# Patient Record
Sex: FEMALE | Race: WHITE | NOT HISPANIC OR LATINO | Employment: OTHER | URBAN - METROPOLITAN AREA
[De-identification: names, ages, dates, MRNs, and addresses within clinical notes are randomized per-mention and may not be internally consistent; named-entity substitution may affect disease eponyms.]

---

## 2017-06-28 ENCOUNTER — APPOINTMENT (OUTPATIENT)
Dept: WOMENS IMAGING | Facility: HOSPITAL | Age: 57
End: 2017-06-28

## 2017-06-28 PROCEDURE — 77067 SCR MAMMO BI INCL CAD: CPT | Performed by: RADIOLOGY

## 2018-09-27 ENCOUNTER — APPOINTMENT (OUTPATIENT)
Dept: WOMENS IMAGING | Facility: HOSPITAL | Age: 58
End: 2018-09-27

## 2018-09-27 PROCEDURE — 77067 SCR MAMMO BI INCL CAD: CPT | Performed by: RADIOLOGY

## 2021-06-11 ENCOUNTER — APPOINTMENT (OUTPATIENT)
Dept: WOMENS IMAGING | Facility: HOSPITAL | Age: 61
End: 2021-06-11

## 2021-06-11 PROCEDURE — 77067 SCR MAMMO BI INCL CAD: CPT | Performed by: RADIOLOGY

## 2022-03-29 ENCOUNTER — OFFICE VISIT (OUTPATIENT)
Dept: FAMILY MEDICINE CLINIC | Facility: CLINIC | Age: 62
End: 2022-03-29

## 2022-03-29 VITALS
OXYGEN SATURATION: 99 % | DIASTOLIC BLOOD PRESSURE: 74 MMHG | WEIGHT: 183 LBS | HEART RATE: 69 BPM | SYSTOLIC BLOOD PRESSURE: 122 MMHG | BODY MASS INDEX: 32.43 KG/M2 | HEIGHT: 63 IN

## 2022-03-29 DIAGNOSIS — E03.9 ACQUIRED HYPOTHYROIDISM: Primary | ICD-10-CM

## 2022-03-29 PROCEDURE — 99213 OFFICE O/P EST LOW 20 MIN: CPT | Performed by: STUDENT IN AN ORGANIZED HEALTH CARE EDUCATION/TRAINING PROGRAM

## 2022-03-29 RX ORDER — OMEPRAZOLE 40 MG/1
CAPSULE, DELAYED RELEASE ORAL
COMMUNITY
Start: 2021-11-30

## 2022-03-29 RX ORDER — TRIAMTERENE AND HYDROCHLOROTHIAZIDE 37.5; 25 MG/1; MG/1
TABLET ORAL
COMMUNITY
Start: 2022-03-01 | End: 2022-04-03

## 2022-03-29 RX ORDER — AMLODIPINE BESYLATE 10 MG/1
TABLET ORAL
COMMUNITY
Start: 2022-03-01 | End: 2022-05-06

## 2022-03-29 RX ORDER — TRAZODONE HYDROCHLORIDE 50 MG/1
TABLET ORAL
COMMUNITY
Start: 2022-03-16 | End: 2022-06-27

## 2022-03-29 RX ORDER — LEVOTHYROXINE SODIUM 0.12 MG/1
TABLET ORAL
COMMUNITY
Start: 2022-03-01 | End: 2022-04-03

## 2022-03-29 NOTE — ASSESSMENT & PLAN NOTE
Patient with symptoms that could be consistent with acquired hypothyroidism she is uncontrolled at this time.  Advised we do blood work today, and if abnormal will repeat in 2 to 3 weeks.  If thyroid is in normal levels will plan on increasing sertraline as she is having significant stress at home, and this could be depression related.  She is agreeable with this plan, and we will discuss further when blood work is available

## 2022-03-29 NOTE — PROGRESS NOTES
"Chief Complaint  Thyroid Problem    Subjective          Nat Candelaria presents to [facility]  She states that she has been having a lot of stress at home with her  recently being disagnosed with Cancer of the esophagous, and he is starting Chemo and radiation in the next week or so. She tstates that she wants to make sure that she has everything good before this.     Thyroid Problem  Presents for follow-up visit. Symptoms include cold intolerance, depressed mood, dry skin, fatigue, hair loss and weight gain. Patient reports no palpitations or visual change. The symptoms have been worsening.       Objective   Vital Signs:   /74 (BP Location: Left arm, Patient Position: Sitting, Cuff Size: Large Adult)   Pulse 69   Ht 160 cm (63\")   Wt 83 kg (183 lb)   SpO2 99%   BMI 32.42 kg/m²     Body mass index is 32.42 kg/m².    Review of Systems   Constitutional: Positive for fatigue and unexpected weight gain.   Cardiovascular: Negative for palpitations.   Endocrine: Positive for cold intolerance.   Skin: Positive for dry skin.   Psychiatric/Behavioral: Positive for depressed mood.       Past History:  Medical History: has a past medical history of Acquired hypothyroidism, Benign hypertension, Degenerative joint disease involving multiple joints, Diverticulosis, Mixed hyperlipidemia, Obesity, Primary insomnia, Primary osteoarthritis involving multiple joints, and Recurrent major depressive episodes, mild (HCC).   Surgical History: has a past surgical history that includes Hysterectomy and Spine surgery.   Family History: family history is not on file.   Social History: reports that she has never smoked. She has never used smokeless tobacco. Alcohol use questions deferred to the physician. Drug use questions deferred to the physician.      Current Outpatient Medications:   •  amLODIPine (NORVASC) 10 MG tablet, , Disp: , Rfl:   •  levothyroxine (SYNTHROID, LEVOTHROID) 125 MCG " tablet, , Disp: , Rfl:   •  omeprazole (priLOSEC) 40 MG capsule, , Disp: , Rfl:   •  sertraline (ZOLOFT) 50 MG tablet, , Disp: , Rfl:   •  traZODone (DESYREL) 50 MG tablet, , Disp: , Rfl:   •  triamterene-hydrochlorothiazide (MAXZIDE-25) 37.5-25 MG per tablet, , Disp: , Rfl:     Allergies: Patient has no known allergies.    Physical Exam  Constitutional:       General: She is not in acute distress.     Appearance: Normal appearance. She is not ill-appearing or toxic-appearing.   HENT:      Head: Normocephalic and atraumatic.      Right Ear: Tympanic membrane and ear canal normal.      Left Ear: Tympanic membrane and ear canal normal.   Cardiovascular:      Rate and Rhythm: Normal rate and regular rhythm.      Heart sounds: No murmur heard.    No gallop.   Pulmonary:      Effort: Pulmonary effort is normal.      Breath sounds: Normal breath sounds.   Abdominal:      General: Abdomen is flat.      Palpations: Abdomen is soft.      Tenderness: There is no abdominal tenderness. There is no guarding.   Musculoskeletal:      Right lower leg: No edema.      Left lower leg: No edema.   Lymphadenopathy:      Cervical: No cervical adenopathy.   Neurological:      General: No focal deficit present.      Mental Status: She is alert and oriented to person, place, and time.   Psychiatric:         Mood and Affect: Mood normal.         Thought Content: Thought content normal.          Result Review :                   Assessment and Plan    Diagnoses and all orders for this visit:    1. Acquired hypothyroidism (Primary)  Assessment & Plan:  Patient with symptoms that could be consistent with acquired hypothyroidism she is uncontrolled at this time.  Advised we do blood work today, and if abnormal will repeat in 2 to 3 weeks.  If thyroid is in normal levels will plan on increasing sertraline as she is having significant stress at home, and this could be depression related.  She is agreeable with this plan, and we will discuss  further when blood work is available    Orders:  -     TSH  -     T4, free  -     T3, free      Follow Up   No follow-ups on file.  Patient was given instructions and counseling regarding her condition or for health maintenance advice. Please see specific information pulled into the AVS if appropriate.     Regina Harris, DO

## 2022-03-30 LAB
T3FREE SERPL-MCNC: 2.6 PG/ML (ref 2–4.4)
T4 FREE SERPL-MCNC: 1.85 NG/DL (ref 0.82–1.77)
TSH SERPL DL<=0.005 MIU/L-ACNC: 0.2 UIU/ML (ref 0.45–4.5)

## 2022-04-03 RX ORDER — TRIAMTERENE AND HYDROCHLOROTHIAZIDE 37.5; 25 MG/1; MG/1
TABLET ORAL
Qty: 30 TABLET | Refills: 2 | Status: SHIPPED | OUTPATIENT
Start: 2022-04-03 | End: 2022-06-29

## 2022-04-03 RX ORDER — LEVOTHYROXINE SODIUM 0.12 MG/1
TABLET ORAL
Qty: 30 TABLET | Refills: 2 | Status: SHIPPED | OUTPATIENT
Start: 2022-04-03 | End: 2022-04-20

## 2022-04-04 DIAGNOSIS — E03.9 ACQUIRED HYPOTHYROIDISM: Primary | ICD-10-CM

## 2022-04-04 NOTE — TELEPHONE ENCOUNTER
Patient called about her levothyroxine because it wasn't available for .  She is completely out of this medication and she is asking that we call the pharmacy to see what the potential hold up may be.

## 2022-04-18 ENCOUNTER — LAB (OUTPATIENT)
Dept: FAMILY MEDICINE CLINIC | Facility: CLINIC | Age: 62
End: 2022-04-18

## 2022-04-18 DIAGNOSIS — E03.9 ACQUIRED HYPOTHYROIDISM: ICD-10-CM

## 2022-04-18 PROCEDURE — 36415 COLL VENOUS BLD VENIPUNCTURE: CPT | Performed by: STUDENT IN AN ORGANIZED HEALTH CARE EDUCATION/TRAINING PROGRAM

## 2022-04-19 LAB
T3FREE SERPL-MCNC: 2.9 PG/ML (ref 2–4.4)
T4 FREE SERPL-MCNC: 1.84 NG/DL (ref 0.82–1.77)
TSH SERPL DL<=0.005 MIU/L-ACNC: 0.1 UIU/ML (ref 0.45–4.5)

## 2022-04-20 RX ORDER — LEVOTHYROXINE SODIUM 112 UG/1
112 TABLET ORAL DAILY
Qty: 30 TABLET | Refills: 1 | Status: SHIPPED | OUTPATIENT
Start: 2022-04-20 | End: 2022-06-25 | Stop reason: SDUPTHER

## 2022-05-06 RX ORDER — AMLODIPINE BESYLATE 10 MG/1
TABLET ORAL
Qty: 30 TABLET | Refills: 2 | Status: SHIPPED | OUTPATIENT
Start: 2022-05-06 | End: 2022-08-01

## 2022-05-06 NOTE — TELEPHONE ENCOUNTER
Incoming Refill Request      Medication requested (name and dose):   AMLODIPINE 10 MG    Pharmacy where request should be sent: [facility]    Additional details provided by patient: PT REPORTS SHE IS COMPLETELY OUT OF THIS MED AT THIS TIME.     Best call back number: [phone]    Does the patient have less than a 3 day supply:  [x] Yes  [] No    Samantha GARCIA Rep  05/06/22, 15:24 EDT

## 2022-05-11 ENCOUNTER — OFFICE VISIT (OUTPATIENT)
Dept: FAMILY MEDICINE CLINIC | Facility: CLINIC | Age: 62
End: 2022-05-11

## 2022-05-11 VITALS
HEIGHT: 63 IN | BODY MASS INDEX: 32.05 KG/M2 | WEIGHT: 180.9 LBS | OXYGEN SATURATION: 97 % | SYSTOLIC BLOOD PRESSURE: 120 MMHG | DIASTOLIC BLOOD PRESSURE: 78 MMHG | HEART RATE: 78 BPM

## 2022-05-11 DIAGNOSIS — H93.8X3 EAR FULLNESS, BILATERAL: Primary | ICD-10-CM

## 2022-05-11 PROCEDURE — 99213 OFFICE O/P EST LOW 20 MIN: CPT | Performed by: NURSE PRACTITIONER

## 2022-05-11 RX ORDER — PREDNISONE 20 MG/1
TABLET ORAL
Qty: 13 TABLET | Refills: 0 | Status: SHIPPED | OUTPATIENT
Start: 2022-05-11 | End: 2022-06-24

## 2022-05-11 NOTE — PROGRESS NOTES
"Chief Complaint  Bilateral ear Pain (Thyroid low/)    Subjective          Nat Candelaria presents to [facility]  History of Present Illness    Patient presents with concerns of bilateral ear fullness and pressure.  Right worse than left.  States at times pressure feels pretty full in the right ear.  No change in hearing.  No discharge.  No fever no chills no body aches.  No congestion.  Takes a daily allergy pill.    She also wanted to clarify her thyroid medication.  She she was wondering why Dr. Cesar decreased her levothyroxine if her TSH was low.  I provided education and informed her why this was done this way.  She will return in 3 weeks to have her TSH redrawn.    Objective   Vital Signs:   /78   Pulse 78   Ht 160 cm (63\")   Wt 82.1 kg (180 lb 14.4 oz)   SpO2 97%   BMI 32.04 kg/m²     Body mass index is 32.04 kg/m².    Review of Systems   Constitutional: Negative for chills, fatigue and fever.   HENT: Positive for ear pain. Negative for congestion, ear discharge, postnasal drip, rhinorrhea, sinus pressure, sneezing, sore throat, swollen glands and tinnitus.    Eyes: Negative for blurred vision.   Respiratory: Negative for cough, shortness of breath and wheezing.    Musculoskeletal: Negative for neck pain.   Neurological: Negative for dizziness, light-headedness and headache.       Past History:  Medical History: has a past medical history of Acquired hypothyroidism, Benign hypertension, Degenerative joint disease involving multiple joints, Diverticulosis, Mixed hyperlipidemia, Obesity, Primary insomnia, Primary osteoarthritis involving multiple joints, and Recurrent major depressive episodes, mild (HCC).   Surgical History: has a past surgical history that includes Hysterectomy and Spine surgery.   Family History: family history is not on file.   Social History: reports that she quit smoking about 30 years ago. Her smoking use included cigarettes. She has never used " smokeless tobacco. Alcohol use questions deferred to the physician. Drug use questions deferred to the physician.    PHQ-2 Depression Screening  Little interest or pleasure in doing things? 0-->not at all   Feeling down, depressed, or hopeless? 1-->several days   PHQ-2 Total Score 1        PHQ-9 Depression Screening  Little interest or pleasure in doing things? 0-->not at all   Feeling down, depressed, or hopeless? 1-->several days   Trouble falling or staying asleep, or sleeping too much?     Feeling tired or having little energy?     Poor appetite or overeating?     Feeling bad about yourself - or that you are a failure or have let yourself or your family down?     Trouble concentrating on things, such as reading the newspaper or watching television?     Moving or speaking so slowly that other people could have noticed? Or the opposite - being so fidgety or restless that you have been moving around a lot more than usual?     Thoughts that you would be better off dead, or of hurting yourself in some way?     PHQ-9 Total Score 1   If you checked off any problems, how difficult have these problems made it for you to do your work, take care of things at home, or get along with other people?       PHQ-9 Total Score: 1               Current Outpatient Medications:   •  amLODIPine (NORVASC) 10 MG tablet, TAKE ONE TABLET BY MOUTH DAILY, Disp: 30 tablet, Rfl: 2  •  levothyroxine (SYNTHROID, LEVOTHROID) 112 MCG tablet, Take 1 tablet by mouth Daily., Disp: 30 tablet, Rfl: 1  •  omeprazole (priLOSEC) 40 MG capsule, , Disp: , Rfl:   •  sertraline (ZOLOFT) 50 MG tablet, TAKE ONE TABLET BY MOUTH DAILY, Disp: 30 tablet, Rfl: 2  •  traZODone (DESYREL) 50 MG tablet, , Disp: , Rfl:   •  triamterene-hydrochlorothiazide (MAXZIDE-25) 37.5-25 MG per tablet, TAKE ONE TABLET BY MOUTH DAILY, Disp: 30 tablet, Rfl: 2   (Not in a hospital admission)     Allergies: Patient has no known allergies.    Physical Exam  Constitutional:        Appearance: Normal appearance.   HENT:      Head: Normocephalic.      Right Ear: Ear canal and external ear normal.      Left Ear: Ear canal and external ear normal.      Ears:      Comments: Bilateral tympanic membranes bulging.  Right worse than left.  No redness.  No discharge.     Nose: Nose normal.      Mouth/Throat:      Mouth: Mucous membranes are moist.      Pharynx: Oropharynx is clear.   Eyes:      Conjunctiva/sclera: Conjunctivae normal.      Pupils: Pupils are equal, round, and reactive to light.   Cardiovascular:      Rate and Rhythm: Normal rate and regular rhythm.      Heart sounds: Normal heart sounds.   Pulmonary:      Effort: Pulmonary effort is normal.      Breath sounds: Normal breath sounds.   Neurological:      General: No focal deficit present.      Mental Status: She is alert and oriented to person, place, and time. Mental status is at baseline.   Psychiatric:         Mood and Affect: Mood normal.         Behavior: Behavior normal.         Thought Content: Thought content normal.         Judgment: Judgment normal.          Result Review :                   Assessment and Plan    Diagnoses and all orders for this visit:    1. Ear fullness, bilateral (Primary)  Assessment & Plan:  No infection present.  We will try course of prednisone.  Avoid NSAIDs while on prednisone.  Risk discussed and understood.  Encouraged allergy pill and Flonase.  Return in 5 days if no improvement, sooner if worsens.  Return to clinic or ED with any issues or concerns.                     Follow Up   Return if symptoms worsen or fail to improve.  Patient was given instructions and counseling regarding her condition or for health maintenance advice. Please see specific information pulled into the AVS if appropriate.     SABRINA Salmon

## 2022-05-11 NOTE — ASSESSMENT & PLAN NOTE
No infection present.  We will try course of prednisone.  Avoid NSAIDs while on prednisone.  Risk discussed and understood.  Encouraged allergy pill and Flonase.  Return in 5 days if no improvement, sooner if worsens.  Return to clinic or ED with any issues or concerns.

## 2022-06-24 ENCOUNTER — OFFICE VISIT (OUTPATIENT)
Dept: FAMILY MEDICINE CLINIC | Facility: CLINIC | Age: 62
End: 2022-06-24

## 2022-06-24 VITALS
OXYGEN SATURATION: 97 % | BODY MASS INDEX: 32.06 KG/M2 | DIASTOLIC BLOOD PRESSURE: 70 MMHG | SYSTOLIC BLOOD PRESSURE: 120 MMHG | HEART RATE: 71 BPM | WEIGHT: 181 LBS

## 2022-06-24 DIAGNOSIS — R31.1 BENIGN ESSENTIAL MICROSCOPIC HEMATURIA: ICD-10-CM

## 2022-06-24 DIAGNOSIS — R60.9 EDEMA, UNSPECIFIED TYPE: ICD-10-CM

## 2022-06-24 DIAGNOSIS — M25.50 ARTHRALGIA, UNSPECIFIED JOINT: ICD-10-CM

## 2022-06-24 DIAGNOSIS — E03.9 ACQUIRED HYPOTHYROIDISM: Primary | ICD-10-CM

## 2022-06-24 DIAGNOSIS — H92.01 RIGHT EAR PAIN: ICD-10-CM

## 2022-06-24 PROBLEM — R22.40 LOCALIZED SWELLING OF LOWER LEG: Status: RESOLVED | Noted: 2022-06-24 | Resolved: 2022-06-24

## 2022-06-24 PROBLEM — R22.40 LOCALIZED SWELLING OF LOWER LEG: Status: ACTIVE | Noted: 2022-06-24

## 2022-06-24 PROCEDURE — 99214 OFFICE O/P EST MOD 30 MIN: CPT | Performed by: NURSE PRACTITIONER

## 2022-06-24 NOTE — PROGRESS NOTES
Chief Complaint  Earache and Thyroid Problem    Subjective          Nat Candelaria presents to [facility]  History of Present Illness     Patient states continuing to have right ear pain.  We have tried a course of prednisone which she states did not help much.  She has since also been to urgent care and then a course of an antibiotic which she states also did not help.  No hearing change no discharge.  Needing ENT referral for further evaluation.  No fever no chills     History of hypothyroidism and doing well on levothyroxine.  Has recently changed doses in the past so needs to have her TSH level rechecked.    Also states intermittent lower extremity swelling that is mild at times.  States she tries to wear compression stockings and this does help.  No swelling currently.  No urinary issues states she just saw cardiology for cardiac evaluation back in January so denies cardiac referral.    Also states she has aches in her hands and feet.  Would like to have some blood work completed to check for autoimmune disorders.  No redness no warmth no swelling.    Objective   Vital Signs:   /70   Pulse 71   Wt 82.1 kg (181 lb)   SpO2 97%   BMI 32.06 kg/m²     Body mass index is 32.06 kg/m².    Review of Systems   Constitutional: Negative for chills, fatigue and fever.   HENT: Positive for ear pain. Negative for congestion, drooling, ear discharge, facial swelling, hearing loss, postnasal drip, rhinorrhea, sinus pressure, sneezing, sore throat and swollen glands.    Eyes: Negative for blurred vision and visual disturbance.   Respiratory: Negative.    Cardiovascular: Negative.    Gastrointestinal: Negative for abdominal pain, diarrhea, nausea and vomiting.   Genitourinary: Negative for decreased urine volume, difficulty urinating, dysuria, frequency and urgency.   Musculoskeletal: Positive for arthralgias. Negative for back pain.   Skin: Negative for rash and wound.   Neurological:  Negative for dizziness, weakness, light-headedness, headache and confusion.   Psychiatric/Behavioral: Negative for suicidal ideas.       Past History:  Medical History: has a past medical history of Acquired hypothyroidism, Benign hypertension, Degenerative joint disease involving multiple joints, Diverticulosis, Mixed hyperlipidemia, Obesity, Primary insomnia, Primary osteoarthritis involving multiple joints, and Recurrent major depressive episodes, mild (HCC).   Surgical History: has a past surgical history that includes Hysterectomy and Spine surgery.   Family History: family history is not on file.   Social History: reports that she quit smoking about 30 years ago. Her smoking use included cigarettes. She has never used smokeless tobacco. Alcohol use questions deferred to the physician. Drug use questions deferred to the physician.    PHQ-2 Depression Screening  Little interest or pleasure in doing things?     Feeling down, depressed, or hopeless?     PHQ-2 Total Score          PHQ-9 Depression Screening  Little interest or pleasure in doing things?     Feeling down, depressed, or hopeless?     Trouble falling or staying asleep, or sleeping too much?     Feeling tired or having little energy?     Poor appetite or overeating?     Feeling bad about yourself - or that you are a failure or have let yourself or your family down?     Trouble concentrating on things, such as reading the newspaper or watching television?     Moving or speaking so slowly that other people could have noticed? Or the opposite - being so fidgety or restless that you have been moving around a lot more than usual?     Thoughts that you would be better off dead, or of hurting yourself in some way?     PHQ-9 Total Score     If you checked off any problems, how difficult have these problems made it for you to do your work, take care of things at home, or get along with other people?       PHQ-9 Total Score:                 Current Outpatient  Medications:   •  amLODIPine (NORVASC) 10 MG tablet, TAKE ONE TABLET BY MOUTH DAILY, Disp: 30 tablet, Rfl: 2  •  levothyroxine (SYNTHROID, LEVOTHROID) 112 MCG tablet, Take 1 tablet by mouth Daily., Disp: 30 tablet, Rfl: 1  •  omeprazole (priLOSEC) 40 MG capsule, , Disp: , Rfl:   •  sertraline (ZOLOFT) 50 MG tablet, TAKE ONE TABLET BY MOUTH DAILY, Disp: 30 tablet, Rfl: 2  •  traZODone (DESYREL) 50 MG tablet, , Disp: , Rfl:   •  triamterene-hydrochlorothiazide (MAXZIDE-25) 37.5-25 MG per tablet, TAKE ONE TABLET BY MOUTH DAILY, Disp: 30 tablet, Rfl: 2   (Not in a hospital admission)     Allergies: Patient has no known allergies.    Physical Exam  Constitutional:       Appearance: Normal appearance.   HENT:      Right Ear: Ear canal and external ear normal.      Left Ear: Tympanic membrane, ear canal and external ear normal.      Ears:      Comments: Right TM bulging. No redness, no warmth, no discharge.      Mouth/Throat:      Mouth: Mucous membranes are moist.      Pharynx: Oropharynx is clear.   Eyes:      Extraocular Movements: Extraocular movements intact.      Conjunctiva/sclera: Conjunctivae normal.      Pupils: Pupils are equal, round, and reactive to light.   Cardiovascular:      Rate and Rhythm: Normal rate and regular rhythm.      Heart sounds: Normal heart sounds.   Pulmonary:      Effort: Pulmonary effort is normal.      Breath sounds: Normal breath sounds.   Musculoskeletal:      Right lower leg: No edema.      Left lower leg: No edema.   Neurological:      General: No focal deficit present.      Mental Status: She is alert and oriented to person, place, and time. Mental status is at baseline.   Psychiatric:         Mood and Affect: Mood normal.         Behavior: Behavior normal.         Thought Content: Thought content normal.         Judgment: Judgment normal.          Result Review :                   Assessment and Plan    Diagnoses and all orders for this visit:    1. Acquired hypothyroidism  (Primary)  Assessment & Plan:  Denies refill currently.  Will recheck TSH today.  Risk discussed and understood.  Return to clinic or ED with any issues or concerns.    Orders:  -     TSH Rfx On Abnormal To Free T4; Future  -     TSH Rfx On Abnormal To Free T4  -     Urine Culture, Comprehen ([facility]) - Urine, Clean Catch; Future  -     Urine Culture, Comprehen ([facility]) - Urine, Clean Catch    2. Arthralgia, unspecified joint  Assessment & Plan:  She would like to be checked for autoimmune Stoetzel check some labs.  Tylenol and ibuprofen as needed pain.  Return to clinic or ED with any issues or concerns.    Orders:  -     JAYLENE; Future  -     C-reactive Protein; Future  -     Sedimentation Rate; Future  -     Rheumatoid Factor; Future  -     JAYLENE  -     C-reactive Protein  -     Sedimentation Rate  -     Rheumatoid Factor  -     Urine Culture, Comprehen ([facility]) - Urine, Clean Catch; Future  -     Urine Culture, Comprehen ([facility]) - Urine, Clean Catch    3. Edema, unspecified type  Assessment & Plan:  No swelling currently.  We will check some labs.  Elevation encouraged.  Compression stockings encouraged and she states she has these.  Recommended cardiology evaluation she states she just saw cardio earlier this year and denies needing to go back at this time.  Risks understood.  Informed her that if swelling reoccurs return to clinic return to clinic or ED with any issues or concerns.    Orders:  -     CBC & Differential; Future  -     Comprehensive Metabolic Panel; Future  -     POC Urinalysis Dipstick, Automated; Future  -     CBC & Differential  -     Comprehensive Metabolic Panel  -     Urine Culture, Comprehen ([facility]) - Urine, Clean Catch; Future  -     Urine Culture, Comprehen ([facility]) - Urine, Clean Catch    4. Right ear pain  Assessment & Plan:  Needs to see ENT so I will start that referral.  No signs of infection.  Go to ED if worsens.  Return to clinic or ED with any issues or concerns.    Orders:  -      Ambulatory Referral to ENT (Otolaryngology)    5. Benign essential microscopic hematuria  -     Urine Culture, Comprehen ([facility]) - Urine, Clean Catch; Future  -     Urine Culture, Comprehen ([facility]) - Urine, Clean Catch            BMI is >= 30 and <35. (Class 1 Obesity). The following options were offered after discussion;: exercise counseling/recommendations and nutrition counseling/recommendations       Follow Up   Return in about 3 months (around 9/24/2022).  Patient was given instructions and counseling regarding her condition or for health maintenance advice. Please see specific information pulled into the AVS if appropriate.     SABRINA Salmon

## 2022-06-24 NOTE — ASSESSMENT & PLAN NOTE
No swelling currently.  We will check some labs.  Elevation encouraged.  Compression stockings encouraged and she states she has these.  Recommended cardiology evaluation she states she just saw cardio earlier this year and denies needing to go back at this time.  Risks understood.  Informed her that if swelling reoccurs return to clinic return to clinic or ED with any issues or concerns.

## 2022-06-24 NOTE — ASSESSMENT & PLAN NOTE
Needs to see ENT so I will start that referral.  No signs of infection.  Go to ED if worsens.  Return to clinic or ED with any issues or concerns.

## 2022-06-24 NOTE — ASSESSMENT & PLAN NOTE
Denies refill currently.  Will recheck TSH today.  Risk discussed and understood.  Return to clinic or ED with any issues or concerns.

## 2022-06-24 NOTE — ASSESSMENT & PLAN NOTE
She would like to be checked for autoimmune Stoetzel check some labs.  Tylenol and ibuprofen as needed pain.  Return to clinic or ED with any issues or concerns.

## 2022-06-25 LAB
ALBUMIN SERPL-MCNC: 4.3 G/DL (ref 3.8–4.8)
ALBUMIN/GLOB SERPL: 2.4 {RATIO} (ref 1.2–2.2)
ALP SERPL-CCNC: 86 IU/L (ref 44–121)
ALT SERPL-CCNC: 19 IU/L (ref 0–32)
AST SERPL-CCNC: 21 IU/L (ref 0–40)
BASOPHILS # BLD AUTO: 0.1 X10E3/UL (ref 0–0.2)
BASOPHILS NFR BLD AUTO: 1 %
BILIRUB SERPL-MCNC: 0.3 MG/DL (ref 0–1.2)
BUN SERPL-MCNC: 16 MG/DL (ref 8–27)
BUN/CREAT SERPL: 18 (ref 12–28)
CALCIUM SERPL-MCNC: 9.6 MG/DL (ref 8.7–10.3)
CHLORIDE SERPL-SCNC: 102 MMOL/L (ref 96–106)
CO2 SERPL-SCNC: 26 MMOL/L (ref 20–29)
CREAT SERPL-MCNC: 0.89 MG/DL (ref 0.57–1)
CRP SERPL-MCNC: 2 MG/L (ref 0–10)
EGFRCR SERPLBLD CKD-EPI 2021: 73 ML/MIN/1.73
EOSINOPHIL # BLD AUTO: 0.1 X10E3/UL (ref 0–0.4)
EOSINOPHIL NFR BLD AUTO: 2 %
ERYTHROCYTE [DISTWIDTH] IN BLOOD BY AUTOMATED COUNT: 13.1 % (ref 11.7–15.4)
ERYTHROCYTE [SEDIMENTATION RATE] IN BLOOD BY WESTERGREN METHOD: 12 MM/HR (ref 0–40)
GLOBULIN SER CALC-MCNC: 1.8 G/DL (ref 1.5–4.5)
GLUCOSE SERPL-MCNC: 80 MG/DL (ref 65–99)
HCT VFR BLD AUTO: 39.4 % (ref 34–46.6)
HGB BLD-MCNC: 13.1 G/DL (ref 11.1–15.9)
IMM GRANULOCYTES # BLD AUTO: 0 X10E3/UL (ref 0–0.1)
IMM GRANULOCYTES NFR BLD AUTO: 0 %
LYMPHOCYTES # BLD AUTO: 1.8 X10E3/UL (ref 0.7–3.1)
LYMPHOCYTES NFR BLD AUTO: 32 %
MCH RBC QN AUTO: 29.4 PG (ref 26.6–33)
MCHC RBC AUTO-ENTMCNC: 33.2 G/DL (ref 31.5–35.7)
MCV RBC AUTO: 88 FL (ref 79–97)
MONOCYTES # BLD AUTO: 0.7 X10E3/UL (ref 0.1–0.9)
MONOCYTES NFR BLD AUTO: 12 %
NEUTROPHILS # BLD AUTO: 3 X10E3/UL (ref 1.4–7)
NEUTROPHILS NFR BLD AUTO: 53 %
PLATELET # BLD AUTO: 174 X10E3/UL (ref 150–450)
POTASSIUM SERPL-SCNC: 4.2 MMOL/L (ref 3.5–5.2)
PROT SERPL-MCNC: 6.1 G/DL (ref 6–8.5)
RBC # BLD AUTO: 4.46 X10E6/UL (ref 3.77–5.28)
SODIUM SERPL-SCNC: 141 MMOL/L (ref 134–144)
SPECIMEN STATUS: NORMAL
TSH SERPL DL<=0.005 MIU/L-ACNC: 0.53 UIU/ML (ref 0.45–4.5)
WBC # BLD AUTO: 5.6 X10E3/UL (ref 3.4–10.8)

## 2022-06-25 RX ORDER — LEVOTHYROXINE SODIUM 112 UG/1
112 TABLET ORAL DAILY
Qty: 90 TABLET | Refills: 0 | Status: SHIPPED | OUTPATIENT
Start: 2022-06-25 | End: 2022-09-27

## 2022-06-26 LAB
BACTERIA UR CULT: NORMAL
BACTERIA UR CULT: NORMAL
RHEUMATOID FACT SERPL-ACNC: <10 IU/ML

## 2022-06-27 LAB — ANA SER QL: NEGATIVE

## 2022-06-27 RX ORDER — TRAZODONE HYDROCHLORIDE 50 MG/1
TABLET ORAL
Qty: 60 TABLET | Refills: 2 | Status: SHIPPED | OUTPATIENT
Start: 2022-06-27 | End: 2022-09-27

## 2022-06-27 RX ORDER — LEVOTHYROXINE SODIUM 112 UG/1
TABLET ORAL
Qty: 30 TABLET | OUTPATIENT
Start: 2022-06-27

## 2022-06-28 ENCOUNTER — TELEPHONE (OUTPATIENT)
Dept: FAMILY MEDICINE CLINIC | Facility: CLINIC | Age: 62
End: 2022-06-28

## 2022-06-28 NOTE — TELEPHONE ENCOUNTER
Patient returning Pretty's call.  I read her Jesus's msg about her labs and urine culture.  She verbalized understanding.

## 2022-06-29 RX ORDER — TRIAMTERENE AND HYDROCHLOROTHIAZIDE 37.5; 25 MG/1; MG/1
TABLET ORAL
Qty: 30 TABLET | Refills: 2 | Status: SHIPPED | OUTPATIENT
Start: 2022-06-29 | End: 2022-09-27

## 2022-08-01 RX ORDER — AMLODIPINE BESYLATE 10 MG/1
TABLET ORAL
Qty: 30 TABLET | Refills: 2 | Status: SHIPPED | OUTPATIENT
Start: 2022-08-01 | End: 2022-11-01

## 2022-09-20 ENCOUNTER — OFFICE VISIT (OUTPATIENT)
Dept: FAMILY MEDICINE CLINIC | Facility: CLINIC | Age: 62
End: 2022-09-20

## 2022-09-20 VITALS
HEIGHT: 63 IN | OXYGEN SATURATION: 98 % | HEART RATE: 65 BPM | SYSTOLIC BLOOD PRESSURE: 110 MMHG | BODY MASS INDEX: 31.63 KG/M2 | WEIGHT: 178.5 LBS | DIASTOLIC BLOOD PRESSURE: 68 MMHG

## 2022-09-20 DIAGNOSIS — Z78.9 PATIENT HAD NO FALLS IN PAST YEAR: ICD-10-CM

## 2022-09-20 DIAGNOSIS — Z00.00 ENCOUNTER FOR SUBSEQUENT ANNUAL WELLNESS VISIT (AWV) IN MEDICARE PATIENT: ICD-10-CM

## 2022-09-20 DIAGNOSIS — E03.9 ACQUIRED HYPOTHYROIDISM: ICD-10-CM

## 2022-09-20 DIAGNOSIS — F51.01 PRIMARY INSOMNIA: ICD-10-CM

## 2022-09-20 DIAGNOSIS — E78.2 MIXED HYPERLIPIDEMIA: ICD-10-CM

## 2022-09-20 DIAGNOSIS — M15.9 PRIMARY OSTEOARTHRITIS INVOLVING MULTIPLE JOINTS: ICD-10-CM

## 2022-09-20 DIAGNOSIS — I10 BENIGN HYPERTENSION: ICD-10-CM

## 2022-09-20 DIAGNOSIS — Z12.31 BREAST CANCER SCREENING BY MAMMOGRAM: ICD-10-CM

## 2022-09-20 DIAGNOSIS — M79.671 PAIN OF RIGHT HEEL: Primary | ICD-10-CM

## 2022-09-20 PROBLEM — F33.0 RECURRENT MAJOR DEPRESSIVE EPISODES, MILD: Status: ACTIVE | Noted: 2022-09-20

## 2022-09-20 PROBLEM — M15.0 PRIMARY OSTEOARTHRITIS INVOLVING MULTIPLE JOINTS: Status: ACTIVE | Noted: 2022-09-20

## 2022-09-20 PROCEDURE — 1159F MED LIST DOCD IN RCRD: CPT | Performed by: NURSE PRACTITIONER

## 2022-09-20 PROCEDURE — G0439 PPPS, SUBSEQ VISIT: HCPCS | Performed by: NURSE PRACTITIONER

## 2022-09-20 PROCEDURE — 1170F FXNL STATUS ASSESSED: CPT | Performed by: NURSE PRACTITIONER

## 2022-09-20 PROCEDURE — 99213 OFFICE O/P EST LOW 20 MIN: CPT | Performed by: NURSE PRACTITIONER

## 2022-09-20 RX ORDER — PREDNISONE 20 MG/1
TABLET ORAL
Qty: 18 TABLET | Refills: 0 | Status: SHIPPED | OUTPATIENT
Start: 2022-09-20 | End: 2022-09-29

## 2022-09-20 NOTE — PROGRESS NOTES
QUICK REFERENCE INFORMATION:  The ABCs of the Annual Wellness Visit    Subsequent Medicare Wellness Visit      HEALTH RISK ASSESSMENT    1960    Recent Hospitalizations:  No hospitalization(s) within the last year..    Current Medical Providers:  Patient Care Team:  Melol Ronquillo MD as PCP - General (Family Medicine)    Smoking Status:  Social History     Tobacco Use   Smoking Status Former Smoker   • Packs/day: 1.00   • Years: 16.00   • Pack years: 16.00   • Types: Cigarettes   • Quit date:    • Years since quittin.7   Smokeless Tobacco Never Used       Alcohol Consumption:  Social History     Substance and Sexual Activity   Alcohol Use Yes    Comment: rarely       Depression Screen:   PHQ-2/PHQ-9 Depression Screening 2022   Little Interest or Pleasure in Doing Things 0-->not at all   Feeling Down, Depressed or Hopeless 1-->several days   PHQ-9: Brief Depression Severity Measure Score 1       Health Habits and Functional and Cognitive Screening:  Functional & Cognitive Status 2022   Do you have difficulty preparing food and eating? No   Do you have difficulty bathing yourself, getting dressed or grooming yourself? No   Do you have difficulty using the toilet? No   Do you have difficulty moving around from place to place? No   Do you have trouble with steps or getting out of a bed or a chair? No   Current Diet Well Balanced Diet   Dental Exam Not up to date   Eye Exam Up to date   Exercise (times per week) 5 times per week   Current Exercises Include Walking   Do you need help using the phone?  No   Are you deaf or do you have serious difficulty hearing?  No   Do you need help with transportation? No   Do you need help shopping? No   Do you need help preparing meals?  No   Do you need help with housework?  No   Do you need help with laundry? No   Do you need help taking your medications? No   Do you need help managing money? No   Do you ever drive or ride in a car without wearing a seat  belt? No   Have you felt unusual stress, anger or loneliness in the last month? Yes   Who do you live with? Spouse   If you need help, do you have trouble finding someone available to you? No   Have you been bothered in the last four weeks by sexual problems? No   Do you have difficulty concentrating, remembering or making decisions? No       Fall Risk Screen:  FARHAT Fall Risk Assessment was completed, and patient is at LOW risk for falls.Assessment completed on:9/20/2022    ACE III MINI        Does the patient have evidence of cognitive impairment? No    No opioid medication identified on active medication list. I have reviewed chart for other potential  high risk medication/s and harmful drug interactions in the elderly.          Aspirin use counseling: Does not need ASA (and currently is not on it)    Recent Lab Results:  CMP:  Lab Results   Component Value Date    BUN 16 06/24/2022    CREATININE 0.89 06/24/2022    BCR 18 06/24/2022     06/24/2022    K 4.2 06/24/2022    CO2 26 06/24/2022    CALCIUM 9.6 06/24/2022    PROTENTOTREF 6.1 06/24/2022    ALBUMIN 4.3 06/24/2022    LABGLOBREF 1.8 06/24/2022    LABIL2 2.4 (H) 06/24/2022    BILITOT 0.3 06/24/2022    ALKPHOS 86 06/24/2022    AST 21 06/24/2022    ALT 19 06/24/2022     HbA1c:  No results found for: HGBA1C  Microalbumin:  No results found for: MICROALBUR, POCMALB, POCCREAT  Lipid Panel  Lab Results   Component Value Date    AST 21 06/24/2022    ALT 19 06/24/2022       Visual Acuity:  No exam data present    Age-appropriate Screening Schedule:  Refer to the list below for future screening recommendations based on patient's age, sex and/or medical conditions. Orders for these recommended tests are listed in the plan section. The patient has been provided with a written plan.    Health Maintenance   Topic Date Due   • LIPID PANEL  09/16/2022   • ZOSTER VACCINE (1 of 2) 09/20/2023 (Originally 9/24/2009)   • INFLUENZA VACCINE  10/01/2022   • MAMMOGRAM   06/11/2023   • TDAP/TD VACCINES (2 - Td or Tdap) 03/26/2028        Cora Candelaria is a 61 y.o. female who presents for a Subsequent Wellness Visit.    The following portions of the patient's history were reviewed and updated as appropriate: allergies, current medications, past family history, past medical history, past social history, past surgical history and problem list.    Outpatient Medications Prior to Visit   Medication Sig Dispense Refill   • amLODIPine (NORVASC) 10 MG tablet TAKE ONE TABLET BY MOUTH DAILY 30 tablet 2   • levothyroxine (SYNTHROID, LEVOTHROID) 112 MCG tablet Take 1 tablet by mouth Daily. 90 tablet 0   • omeprazole (priLOSEC) 40 MG capsule      • sertraline (ZOLOFT) 50 MG tablet TAKE ONE TABLET BY MOUTH DAILY 30 tablet 2   • traZODone (DESYREL) 50 MG tablet TAKE ONE TO TWO TABLETS BY MOUTH AT BEDTIME AS NEEDED 60 tablet 2   • triamterene-hydrochlorothiazide (MAXZIDE-25) 37.5-25 MG per tablet TAKE ONE TABLET BY MOUTH DAILY 30 tablet 2     No facility-administered medications prior to visit.       Patient Active Problem List   Diagnosis   • Acquired hypothyroidism   • Ear fullness, bilateral   • Arthralgia   • Edema   • Right ear pain   • Pain of right heel   • Encounter for subsequent annual wellness visit (AWV) in Medicare patient   • Patient had no falls in past year   • Benign hypertension   • Mixed hyperlipidemia   • Primary insomnia   • Recurrent major depressive episodes, mild (HCC)   • Primary osteoarthritis involving multiple joints       Advance Care Planning:  ACP discussion was held with the patient during this visit. Patient does not have an advance directive, information provided.    Identification of Risk Factors:  Risk factors include: Advance Directive Discussion  Breast Cancer/Mammogram Screening  Cardiovascular risk  Chronic Pain   Immunizations Discussed/Encouraged (specific immunizations; Shingrix and COVID19 )  Obesity/Overweight .    Compared to one year ago,  "the patient feels her physical health is worse.  Compared to one year ago, the patient feels her mental health is worse.    Objective       Vitals:    09/20/22 1338   BP: 110/68   Pulse: 65   SpO2: 98%   Weight: 81 kg (178 lb 8 oz)   Height: 160 cm (63\")   PainSc:   8   PainLoc: Comment: neck, back, hip, right foot     BMI Readings from Last 1 Encounters:   09/20/22 31.62 kg/m²   BMI is above normal parameters. Recommendations include: educational material, exercise counseling and nutrition counseling      Assessment & Plan   Problem List Items Addressed This Visit        Cardiac and Vasculature    Benign hypertension    Relevant Medications    triamterene-hydrochlorothiazide (MAXZIDE-25) 37.5-25 MG per tablet    amLODIPine (NORVASC) 10 MG tablet    Mixed hyperlipidemia       Endocrine and Metabolic    Acquired hypothyroidism    Relevant Medications    levothyroxine (SYNTHROID, LEVOTHROID) 112 MCG tablet    predniSONE (DELTASONE) 20 MG tablet       Musculoskeletal and Injuries    Pain of right heel - Primary    Current Assessment & Plan     X-ray completed.  Will let know if radiologist sees anything.  RICE encouraged.  We will try course of prednisone.  Avoid NSAIDs.  Risk of meds discussed and understood.  Follow-up in 1 week if no improvement, sooner if worsens.  Return to clinic or ED with any issues or concerns.         Relevant Medications    predniSONE (DELTASONE) 20 MG tablet    Other Relevant Orders    XR Foot 3+ View Right    Primary osteoarthritis involving multiple joints       Sleep    Primary insomnia       Symptoms and Signs    Patient had no falls in past year       Other    Encounter for subsequent annual wellness visit (AWV) in Medicare patient    Current Assessment & Plan     Updated annual wellness visit checklist.  Immunizations discussed. Patient follow up with pharmacy about Shingrix and covid booster. Colonoscopy up-to-date. No complaints of change in bowel patterns or rectal bleeding. " Mammogram ordered. Recommend yearly dental and eye exams. Also discussed monitoring of blood pressure and lipids. We addressed patient self-assessment of health status, frailty, and physical functioning. We reviewed psychosocial risks, behavioral risks, instrumental activities of daily living, and patient health risk assessment. Patient was given a personalized prevention plan.              Other Visit Diagnoses     Breast cancer screening by mammogram        Relevant Orders    Mammo Screening Bilateral With CAD        Patient Self-Management and Personalized Health Advice  The patient has been provided with information about: diet, exercise, prevention of cardiac or vascular disease, designing advance directives and mental health concerns and preventive services including:   · Annual Wellness Visit (AWV)  · Depression Screening (15 minutes face to face, Code ).    Outpatient Encounter Medications as of 9/20/2022   Medication Sig Dispense Refill   • amLODIPine (NORVASC) 10 MG tablet TAKE ONE TABLET BY MOUTH DAILY 30 tablet 2   • levothyroxine (SYNTHROID, LEVOTHROID) 112 MCG tablet Take 1 tablet by mouth Daily. 90 tablet 0   • omeprazole (priLOSEC) 40 MG capsule      • predniSONE (DELTASONE) 20 MG tablet Take 3 tablets by mouth Daily for 3 days, THEN 2 tablets Daily for 3 days, THEN 1 tablet Daily for 3 days. 18 tablet 0   • sertraline (ZOLOFT) 50 MG tablet TAKE ONE TABLET BY MOUTH DAILY 30 tablet 2   • traZODone (DESYREL) 50 MG tablet TAKE ONE TO TWO TABLETS BY MOUTH AT BEDTIME AS NEEDED 60 tablet 2   • triamterene-hydrochlorothiazide (MAXZIDE-25) 37.5-25 MG per tablet TAKE ONE TABLET BY MOUTH DAILY 30 tablet 2     No facility-administered encounter medications on file as of 9/20/2022.       Follow Up:  Return if symptoms worsen or fail to improve.     There are no Patient Instructions on file for this visit.    An After Visit Summary and PPPS with all of these plans were given to the patient.       I have  reviewed and edited information documented by wellness nurse and documentation on diagnoses is my own.

## 2022-09-20 NOTE — PROGRESS NOTES
"Chief Complaint  Right Foot pain    Subjective          Nat Candelaria presents to [facility]  History of Present Illness     Patient states for the past 3 weeks she has had pain in the base of her right heel that at times radiates up the back of the heel.  No redness no warmth no swelling.  States she is currently helping her  who is going through chemo and states she is doing a lot more around the house including yard work so was wondering if she possibly just overworked it.  States ice has helped some.  But not much.  Standing and walking seem to make it worse towards the end of the day.    Objective   Vital Signs:   /68   Pulse 65   Ht 160 cm (63\")   Wt 81 kg (178 lb 8 oz)   SpO2 98%   BMI 31.62 kg/m²     Body mass index is 31.62 kg/m².    Review of Systems   Constitutional: Negative for fever.   Cardiovascular: Negative for leg swelling.   Musculoskeletal:        Heel pain    Skin: Negative for rash and wound.   Neurological: Negative for numbness.       Past History:  Medical History: has a past medical history of Acquired hypothyroidism, Benign hypertension, Degenerative joint disease involving multiple joints, Diverticulosis, Mixed hyperlipidemia, Obesity, Primary insomnia, Primary osteoarthritis involving multiple joints, and Recurrent major depressive episodes, mild (HCC).   Surgical History: has a past surgical history that includes Hysterectomy and Spine surgery.   Family History: family history is not on file.   Social History: reports that she quit smoking about 30 years ago. Her smoking use included cigarettes. She quit after 16.00 years of use. She has never used smokeless tobacco. Alcohol use questions deferred to the physician. Drug use questions deferred to the physician.    PHQ-2 Depression Screening  Little interest or pleasure in doing things? 0-->not at all   Feeling down, depressed, or hopeless? 1-->several days   PHQ-2 Total Score 1        PHQ-9 " Depression Screening  Little interest or pleasure in doing things? 0-->not at all   Feeling down, depressed, or hopeless? 1-->several days   Trouble falling or staying asleep, or sleeping too much?     Feeling tired or having little energy?     Poor appetite or overeating?     Feeling bad about yourself - or that you are a failure or have let yourself or your family down?     Trouble concentrating on things, such as reading the newspaper or watching television?     Moving or speaking so slowly that other people could have noticed? Or the opposite - being so fidgety or restless that you have been moving around a lot more than usual?     Thoughts that you would be better off dead, or of hurting yourself in some way?     PHQ-9 Total Score 1   If you checked off any problems, how difficult have these problems made it for you to do your work, take care of things at home, or get along with other people?       PHQ-9 Total Score: 1      Patient screened positive for depression based on a PHQ-9 score of 1 on 9/20/2022. Follow-up recommendations include:          Current Outpatient Medications:   •  amLODIPine (NORVASC) 10 MG tablet, TAKE ONE TABLET BY MOUTH DAILY, Disp: 30 tablet, Rfl: 2  •  levothyroxine (SYNTHROID, LEVOTHROID) 112 MCG tablet, Take 1 tablet by mouth Daily., Disp: 90 tablet, Rfl: 0  •  omeprazole (priLOSEC) 40 MG capsule, , Disp: , Rfl:   •  sertraline (ZOLOFT) 50 MG tablet, TAKE ONE TABLET BY MOUTH DAILY, Disp: 30 tablet, Rfl: 2  •  traZODone (DESYREL) 50 MG tablet, TAKE ONE TO TWO TABLETS BY MOUTH AT BEDTIME AS NEEDED, Disp: 60 tablet, Rfl: 2  •  triamterene-hydrochlorothiazide (MAXZIDE-25) 37.5-25 MG per tablet, TAKE ONE TABLET BY MOUTH DAILY, Disp: 30 tablet, Rfl: 2  •  predniSONE (DELTASONE) 20 MG tablet, Take 3 tablets by mouth Daily for 3 days, THEN 2 tablets Daily for 3 days, THEN 1 tablet Daily for 3 days., Disp: 18 tablet, Rfl: 0   (Not in a hospital admission)     Allergies: Patient has no known  allergies.    Physical Exam  Constitutional:       Appearance: Normal appearance.   Musculoskeletal:         General: No swelling.      Right lower leg: No edema.      Right foot: Normal.   Skin:     Findings: No erythema or rash.   Neurological:      General: No focal deficit present.      Mental Status: She is alert and oriented to person, place, and time. Mental status is at baseline.   Psychiatric:         Mood and Affect: Mood normal.         Behavior: Behavior normal.         Thought Content: Thought content normal.         Judgment: Judgment normal.          Result Review :                   Assessment and Plan {CC Problem List  Visit Diagnosis   ROS  Review (Popup)  Health Maintenance  Quality  BestPractice  Medications  SmartSets  SnapShot Encounters  Media :23}   Diagnoses and all orders for this visit:    1. Pain of right heel (Primary)  Assessment & Plan:  X-ray completed.  Will let know if radiologist sees anything.  RICE encouraged.  We will try course of prednisone.  Avoid NSAIDs.  Risk of meds discussed and understood.  Follow-up in 1 week if no improvement, sooner if worsens.  Return to clinic or ED with any issues or concerns.    Orders:  -     XR Foot 3+ View Right; Future  -     predniSONE (DELTASONE) 20 MG tablet; Take 3 tablets by mouth Daily for 3 days, THEN 2 tablets Daily for 3 days, THEN 1 tablet Daily for 3 days.  Dispense: 18 tablet; Refill: 0            BMI is >= 30 and <35. (Class 1 Obesity). The following options were offered after discussion;: exercise counseling/recommendations and nutrition counseling/recommendations       Follow Up   Return if symptoms worsen or fail to improve.  Patient was given instructions and counseling regarding her condition or for health maintenance advice. Please see specific information pulled into the AVS if appropriate.     SABRINA Salmon

## 2022-09-20 NOTE — ASSESSMENT & PLAN NOTE
X-ray completed.  Will let know if radiologist sees anything.  RICE encouraged.  We will try course of prednisone.  Avoid NSAIDs.  Risk of meds discussed and understood.  Follow-up in 1 week if no improvement, sooner if worsens.  Return to clinic or ED with any issues or concerns.

## 2022-09-20 NOTE — ASSESSMENT & PLAN NOTE
Updated annual wellness visit checklist.  Immunizations discussed. Patient follow up with pharmacy about Shingrix and covid booster. Colonoscopy up-to-date. No complaints of change in bowel patterns or rectal bleeding. Mammogram ordered. Recommend yearly dental and eye exams. Also discussed monitoring of blood pressure and lipids. We addressed patient self-assessment of health status, frailty, and physical functioning. We reviewed psychosocial risks, behavioral risks, instrumental activities of daily living, and patient health risk assessment. Patient was given a personalized prevention plan.

## 2022-09-27 RX ORDER — TRIAMTERENE AND HYDROCHLOROTHIAZIDE 37.5; 25 MG/1; MG/1
TABLET ORAL
Qty: 30 TABLET | Refills: 2 | Status: SHIPPED | OUTPATIENT
Start: 2022-09-27 | End: 2022-12-27

## 2022-09-27 RX ORDER — LEVOTHYROXINE SODIUM 112 UG/1
TABLET ORAL
Qty: 90 TABLET | Refills: 0 | Status: SHIPPED | OUTPATIENT
Start: 2022-09-27 | End: 2022-12-28

## 2022-09-27 RX ORDER — TRAZODONE HYDROCHLORIDE 50 MG/1
TABLET ORAL
Qty: 60 TABLET | Refills: 2 | Status: SHIPPED | OUTPATIENT
Start: 2022-09-27 | End: 2023-01-03

## 2022-09-29 ENCOUNTER — OFFICE VISIT (OUTPATIENT)
Dept: FAMILY MEDICINE CLINIC | Facility: CLINIC | Age: 62
End: 2022-09-29

## 2022-09-29 VITALS
BODY MASS INDEX: 31.89 KG/M2 | SYSTOLIC BLOOD PRESSURE: 130 MMHG | HEIGHT: 63 IN | WEIGHT: 180 LBS | DIASTOLIC BLOOD PRESSURE: 76 MMHG | HEART RATE: 67 BPM | OXYGEN SATURATION: 98 %

## 2022-09-29 DIAGNOSIS — R10.9 ABDOMINAL PAIN, UNSPECIFIED ABDOMINAL LOCATION: ICD-10-CM

## 2022-09-29 DIAGNOSIS — R35.0 URINARY FREQUENCY: Primary | ICD-10-CM

## 2022-09-29 LAB
BILIRUB BLD-MCNC: NEGATIVE MG/DL
CLARITY, POC: CLEAR
COLOR UR: ABNORMAL
GLUCOSE UR STRIP-MCNC: ABNORMAL MG/DL
KETONES UR QL: NEGATIVE
LEUKOCYTE EST, POC: NEGATIVE
NITRITE UR-MCNC: POSITIVE MG/ML
PH UR: 6.5 [PH] (ref 5–8)
PROT UR STRIP-MCNC: NEGATIVE MG/DL
RBC # UR STRIP: NEGATIVE /UL
SP GR UR: 1.01 (ref 1–1.03)
UROBILINOGEN UR QL: NORMAL

## 2022-09-29 PROCEDURE — 81002 URINALYSIS NONAUTO W/O SCOPE: CPT | Performed by: PHYSICIAN ASSISTANT

## 2022-09-29 PROCEDURE — 99213 OFFICE O/P EST LOW 20 MIN: CPT | Performed by: PHYSICIAN ASSISTANT

## 2022-09-29 RX ORDER — CEFDINIR 300 MG/1
300 CAPSULE ORAL 2 TIMES DAILY
Qty: 14 CAPSULE | Refills: 0 | Status: SHIPPED | OUTPATIENT
Start: 2022-09-29 | End: 2022-10-06

## 2022-09-29 NOTE — PROGRESS NOTES
"Chief Complaint  Urinary Tract Infection (Pressure in bladder for a week abdominal pain. )    Subjective          Nat Candelaria presents to [facility]  History of Present Illness  Patient in today for evaluation on symptoms of possible urinary tract infection.  She states she has had increase in frequency of urination along with some lower abdominal pressure at times.  This has been ongoing for the past week.  Denies any fever.  No vomiting or diarrhea. She has been taking otc AZO.   Urinary Tract Infection   The current episode started in the past 7 days. There has been no fever. Associated symptoms include frequency and urgency. Pertinent negatives include no nausea or vomiting.       Objective   Vital Signs:   /76 (BP Location: Left arm, Patient Position: Sitting, Cuff Size: Large Adult)   Pulse 67   Ht 160 cm (63\")   Wt 81.6 kg (180 lb)   SpO2 98%   BMI 31.89 kg/m²     Body mass index is 31.89 kg/m².    Review of Systems   Constitutional: Negative for fever.   Gastrointestinal: Negative for abdominal pain, diarrhea, nausea and vomiting.   Genitourinary: Positive for frequency and urgency. Negative for dysuria.   Neurological: Negative for dizziness and headache.       Past History:  Medical History: has a past medical history of Acquired hypothyroidism, Benign hypertension, Degenerative joint disease involving multiple joints, Diverticulosis, Mixed hyperlipidemia, Obesity, Primary insomnia, Primary osteoarthritis involving multiple joints, and Recurrent major depressive episodes, mild (HCC).   Surgical History: has a past surgical history that includes Hysterectomy and Spine surgery.   Family History: family history includes Atrial fibrillation in her sister; Breast cancer in her mother; Coronary artery disease in her father and mother; Lung cancer in her mother.   Social History: reports that she quit smoking about 30 years ago. Her smoking use included cigarettes. She " has a 16.00 pack-year smoking history. She has never used smokeless tobacco. She reports previous alcohol use. Drug use questions deferred to the physician.      Current Outpatient Medications:   •  amLODIPine (NORVASC) 10 MG tablet, TAKE ONE TABLET BY MOUTH DAILY, Disp: 30 tablet, Rfl: 2  •  levothyroxine (SYNTHROID, LEVOTHROID) 112 MCG tablet, TAKE ONE TABLET BY MOUTH DAILY, Disp: 90 tablet, Rfl: 0  •  omeprazole (priLOSEC) 40 MG capsule, , Disp: , Rfl:   •  predniSONE (DELTASONE) 20 MG tablet, Take 3 tablets by mouth Daily for 3 days, THEN 2 tablets Daily for 3 days, THEN 1 tablet Daily for 3 days., Disp: 18 tablet, Rfl: 0  •  sertraline (ZOLOFT) 50 MG tablet, TAKE ONE TABLET BY MOUTH DAILY, Disp: 30 tablet, Rfl: 2  •  traZODone (DESYREL) 50 MG tablet, TAKE 1 TO 2 TABLETS BY MOUTH EVERY NIGHT AT BEDTIME AS NEEDED, Disp: 60 tablet, Rfl: 2  •  triamterene-hydrochlorothiazide (MAXZIDE-25) 37.5-25 MG per tablet, TAKE ONE TABLET BY MOUTH DAILY, Disp: 30 tablet, Rfl: 2  •  cefdinir (OMNICEF) 300 MG capsule, Take 1 capsule by mouth 2 (Two) Times a Day for 7 days., Disp: 14 capsule, Rfl: 0  Allergies: Patient has no known allergies.    Physical Exam  Constitutional:       Appearance: Normal appearance.   Cardiovascular:      Rate and Rhythm: Normal rate and regular rhythm.      Heart sounds: Normal heart sounds.   Pulmonary:      Effort: Pulmonary effort is normal.      Breath sounds: Normal breath sounds.   Abdominal:      Palpations: Abdomen is soft.      Tenderness: There is no abdominal tenderness. There is no right CVA tenderness or left CVA tenderness.   Neurological:      Mental Status: She is oriented to person, place, and time.   Psychiatric:         Mood and Affect: Mood normal.         Behavior: Behavior normal.             Assessment and Plan   Diagnoses and all orders for this visit:    1. Urinary frequency (Primary)  Discussed the AZO can affect the in house dip but with symptoms as such, will start on  antibiotic and culture urine; encourage good hydration; if not improving, rtc for further evaluation.   2. Abdominal pain, unspecified abdominal location  -     Urine Culture - Urine, Urine, Clean Catch; Future  -     POC Urinalysis Dipstick  -     Urine Culture - Urine, Urine, Clean Catch    Other orders  -     cefdinir (OMNICEF) 300 MG capsule; Take 1 capsule by mouth 2 (Two) Times a Day for 7 days.  Dispense: 14 capsule; Refill: 0            Follow Up   Return if symptoms worsen or fail to improve.  Patient was given instructions and counseling regarding her condition or for health maintenance advice. Please see specific information pulled into the AVS if appropriate.     Soco Ramirez PA-C

## 2022-10-01 LAB
BACTERIA UR CULT: NORMAL
BACTERIA UR CULT: NORMAL

## 2022-10-03 ENCOUNTER — TELEPHONE (OUTPATIENT)
Dept: FAMILY MEDICINE CLINIC | Facility: CLINIC | Age: 62
End: 2022-10-03

## 2022-10-03 DIAGNOSIS — R35.0 URINARY FREQUENCY: Primary | ICD-10-CM

## 2022-10-03 NOTE — TELEPHONE ENCOUNTER
Hub staff attempted to follow warm transfer process and was unsuccessful     Caller: Nat Candelaria    Relationship to patient: Self    Best call back number: [phone]     Patient is needing: PT RECEIVED A CALL, DOESN'T KNOW WHO CALLED OR WHY. NO ENCOUNTERS ABOUT THE CALL. PLEASE CALL PATIENT BACK AND LEAVE A VOICE MAIL. SHE HAS SPORADIC SERVICE CURRENTLY AS SHE'S [place] T[place].

## 2022-10-07 ENCOUNTER — TELEPHONE (OUTPATIENT)
Dept: FAMILY MEDICINE CLINIC | Facility: CLINIC | Age: 62
End: 2022-10-07

## 2022-10-07 NOTE — TELEPHONE ENCOUNTER
HUB TO READ    Patient is scheduled Oct 31 at 215pm with Dr Montelongo.  [facility].   [address] [place] [address]  [address]  [place] [address]  Phone: [phone]

## 2022-10-07 NOTE — TELEPHONE ENCOUNTER
PATIENT CALLED IN I ATTEMPTED TO GIVE HER THE HUB TO READ MESSAGE THE CALL DISCONNECTED BEFORE ALL INFORMATION WAS GIVEN

## 2022-11-01 RX ORDER — AMLODIPINE BESYLATE 10 MG/1
TABLET ORAL
Qty: 30 TABLET | Refills: 2 | Status: SHIPPED | OUTPATIENT
Start: 2022-11-01 | End: 2023-02-09 | Stop reason: SDUPTHER

## 2022-11-09 ENCOUNTER — TELEPHONE (OUTPATIENT)
Dept: FAMILY MEDICINE CLINIC | Facility: CLINIC | Age: 62
End: 2022-11-09

## 2022-11-09 NOTE — TELEPHONE ENCOUNTER
Caller: KENYON RAMIREZ    Relationship: Emergency Contact    Best call back number: [phone]    What was the call regarding: PATIENTS  REPORTS THAT PATIENT TOOK AN AT HOME COVID TEST AT 2AM AND IT WAS POSITIVE. PATIENTS  IS ASKING FOR PAXLOVID OR SOMETHING TO HELP HIS WIFE FEEL BETTER. SYMPTOMS ARE HEADACHES, SINUS CONGESTION, SCRATCHY THROAT, CHILLS, FEVER.     Do you require a callback: YES

## 2022-11-09 NOTE — TELEPHONE ENCOUNTER
Spoke to . Advised him patient will need to be seen.  asked patient if she wanted to come into office and she said no just forget it. Denies SOA or difficulty breathing.

## 2022-11-28 ENCOUNTER — TELEPHONE (OUTPATIENT)
Dept: FAMILY MEDICINE CLINIC | Facility: CLINIC | Age: 62
End: 2022-11-28

## 2022-11-28 DIAGNOSIS — M79.671 RIGHT FOOT PAIN: Primary | ICD-10-CM

## 2022-11-28 NOTE — TELEPHONE ENCOUNTER
PT CALLED AND WANTS TO KNOW IF YOU CAN GO AHEAD AND SCHEDULE HER AN APPT WITH THE ORTHO. PLEASE CALL ONCE YOU HAVE SCHEDULED APPT.

## 2022-11-28 NOTE — TELEPHONE ENCOUNTER
Please clarify with patient that it is regarding her right foot pain.  If it is her right foot pain we may want to refer to podiatry instead.  Let me know.  Thanks

## 2022-11-29 NOTE — TELEPHONE ENCOUNTER
HUB TO READ    Please clarify with patient that it is regarding her right foot pain.  If it is her right foot pain we may want to refer to podiatry instead.  Let me know.  Thanks    Message left

## 2022-12-27 RX ORDER — TRIAMTERENE AND HYDROCHLOROTHIAZIDE 37.5; 25 MG/1; MG/1
TABLET ORAL
Qty: 30 TABLET | Refills: 0 | Status: SHIPPED | OUTPATIENT
Start: 2022-12-27 | End: 2023-02-09 | Stop reason: SDUPTHER

## 2022-12-28 RX ORDER — LEVOTHYROXINE SODIUM 112 UG/1
TABLET ORAL
Qty: 90 TABLET | Refills: 0 | Status: SHIPPED | OUTPATIENT
Start: 2022-12-28 | End: 2023-02-09 | Stop reason: SDUPTHER

## 2023-01-03 RX ORDER — TRAZODONE HYDROCHLORIDE 50 MG/1
TABLET ORAL
Qty: 60 TABLET | Refills: 2 | Status: SHIPPED | OUTPATIENT
Start: 2023-01-03 | End: 2023-02-09 | Stop reason: SDUPTHER

## 2023-02-09 ENCOUNTER — OFFICE VISIT (OUTPATIENT)
Dept: FAMILY MEDICINE CLINIC | Facility: CLINIC | Age: 63
End: 2023-02-09
Payer: MEDICARE

## 2023-02-09 VITALS
DIASTOLIC BLOOD PRESSURE: 90 MMHG | HEIGHT: 63 IN | OXYGEN SATURATION: 98 % | WEIGHT: 173 LBS | HEART RATE: 66 BPM | BODY MASS INDEX: 30.65 KG/M2 | SYSTOLIC BLOOD PRESSURE: 166 MMHG

## 2023-02-09 DIAGNOSIS — E03.9 ACQUIRED HYPOTHYROIDISM: ICD-10-CM

## 2023-02-09 DIAGNOSIS — I10 BENIGN HYPERTENSION: ICD-10-CM

## 2023-02-09 DIAGNOSIS — F41.9 ANXIETY: ICD-10-CM

## 2023-02-09 DIAGNOSIS — R20.2 PARESTHESIA: Primary | ICD-10-CM

## 2023-02-09 PROCEDURE — 99214 OFFICE O/P EST MOD 30 MIN: CPT | Performed by: FAMILY MEDICINE

## 2023-02-09 PROCEDURE — 36415 COLL VENOUS BLD VENIPUNCTURE: CPT | Performed by: FAMILY MEDICINE

## 2023-02-09 RX ORDER — TRAZODONE HYDROCHLORIDE 50 MG/1
50-100 TABLET ORAL NIGHTLY PRN
Qty: 180 TABLET | Refills: 1 | Status: SHIPPED | OUTPATIENT
Start: 2023-02-09

## 2023-02-09 RX ORDER — LEVOTHYROXINE SODIUM 112 UG/1
112 TABLET ORAL DAILY
Qty: 90 TABLET | Refills: 1 | Status: SHIPPED | OUTPATIENT
Start: 2023-02-09 | End: 2023-02-19 | Stop reason: SDUPTHER

## 2023-02-09 RX ORDER — TRIAMTERENE AND HYDROCHLOROTHIAZIDE 37.5; 25 MG/1; MG/1
1 TABLET ORAL DAILY
Qty: 90 TABLET | Refills: 1 | Status: SHIPPED | OUTPATIENT
Start: 2023-02-09

## 2023-02-09 RX ORDER — AMLODIPINE BESYLATE 10 MG/1
10 TABLET ORAL DAILY
Qty: 90 TABLET | Refills: 1 | Status: SHIPPED | OUTPATIENT
Start: 2023-02-09

## 2023-02-10 LAB
BASOPHILS # BLD AUTO: 0.1 X10E3/UL (ref 0–0.2)
BASOPHILS NFR BLD AUTO: 1 %
EOSINOPHIL # BLD AUTO: 0.1 X10E3/UL (ref 0–0.4)
EOSINOPHIL NFR BLD AUTO: 1 %
ERYTHROCYTE [DISTWIDTH] IN BLOOD BY AUTOMATED COUNT: 13.1 % (ref 11.7–15.4)
HCT VFR BLD AUTO: 40.9 % (ref 34–46.6)
HGB BLD-MCNC: 13.7 G/DL (ref 11.1–15.9)
IMM GRANULOCYTES # BLD AUTO: 0 X10E3/UL (ref 0–0.1)
IMM GRANULOCYTES NFR BLD AUTO: 0 %
LYMPHOCYTES # BLD AUTO: 2.1 X10E3/UL (ref 0.7–3.1)
LYMPHOCYTES NFR BLD AUTO: 31 %
MCH RBC QN AUTO: 29.6 PG (ref 26.6–33)
MCHC RBC AUTO-ENTMCNC: 33.5 G/DL (ref 31.5–35.7)
MCV RBC AUTO: 88 FL (ref 79–97)
MONOCYTES # BLD AUTO: 0.6 X10E3/UL (ref 0.1–0.9)
MONOCYTES NFR BLD AUTO: 9 %
NEUTROPHILS # BLD AUTO: 3.9 X10E3/UL (ref 1.4–7)
NEUTROPHILS NFR BLD AUTO: 58 %
PLATELET # BLD AUTO: 148 X10E3/UL (ref 150–450)
RBC # BLD AUTO: 4.63 X10E6/UL (ref 3.77–5.28)
WBC # BLD AUTO: 6.7 X10E3/UL (ref 3.4–10.8)

## 2023-02-10 NOTE — PROGRESS NOTES
Follow Up Office Visit      Date of Visit:  2023   Patient Name: Nat Candelaria  : 1960   MRN: [id number]     Chief Complaint:    Chief Complaint   Patient presents with   • Hypertension       History of Present Illness: Nat Candelaria is a 62 y.o. female who is here today for follow up.  Patient seen today in follow-up of chronic medical conditions.  Hypertension, hypothyroidism, anxiety controlled.  Patient with some new onset paresthesias in her neck and shoulders.  Unsure of etiology.        Subjective      Review of Systems:   Review of Systems   Constitutional: Negative for fatigue and fever.   HENT: Negative for congestion and ear pain.    Respiratory: Negative for apnea, cough, chest tightness and shortness of breath.    Cardiovascular: Negative for chest pain.   Gastrointestinal: Negative for abdominal pain, constipation, diarrhea and nausea.   Musculoskeletal: Negative for arthralgias.   Neurological: Positive for tremors and numbness.   Psychiatric/Behavioral: Negative for depressed mood and stress.       Past Medical History:   Past Medical History:   Diagnosis Date   • Acquired hypothyroidism    • Benign hypertension    • Degenerative joint disease involving multiple joints    • Diverticulosis    • Mixed hyperlipidemia    • Obesity     CLASS 1 OBESITY DUE TO EXCDSS CALORIES WITH SERIOUS COMORBIDITY AND BODY MASS INDEX(BMI) OF 31.0 TO 31.9 IN ADULT   • Primary insomnia    • Primary osteoarthritis involving multiple joints    • Recurrent major depressive episodes, mild (HCC)        Past Surgical History:   Past Surgical History:   Procedure Laterality Date   • HYSTERECTOMY     • SPINE SURGERY      X3       Family History:   Family History   Problem Relation Age of Onset   • Coronary artery disease Mother    • Breast cancer Mother    • Lung cancer Mother    • Coronary artery disease Father    • Atrial fibrillation Sister        Social History:   Social History     Socioeconomic History   •  "Marital status:    • Number of children: 1   • Highest education level: 12th grade   Tobacco Use   • Smoking status: Former     Packs/day: 1.00     Years: 16.00     Pack years: 16.00     Types: Cigarettes     Quit date:      Years since quittin.1   • Smokeless tobacco: Never   Vaping Use   • Vaping Use: Never used   Substance and Sexual Activity   • Alcohol use: Not Currently     Comment: rarely   • Drug use: Defer   • Sexual activity: Defer       Medications:     Current Outpatient Medications:   •  amLODIPine (NORVASC) 10 MG tablet, Take 1 tablet by mouth Daily., Disp: 90 tablet, Rfl: 1  •  levothyroxine (SYNTHROID, LEVOTHROID) 112 MCG tablet, Take 1 tablet by mouth Daily., Disp: 90 tablet, Rfl: 1  •  sertraline (ZOLOFT) 50 MG tablet, Take 1 tablet by mouth Daily., Disp: 90 tablet, Rfl: 1  •  traZODone (DESYREL) 50 MG tablet, Take 1-2 tablets by mouth At Night As Needed for Sleep., Disp: 180 tablet, Rfl: 1  •  triamterene-hydrochlorothiazide (MAXZIDE-25) 37.5-25 MG per tablet, Take 1 tablet by mouth Daily., Disp: 90 tablet, Rfl: 1  •  omeprazole (priLOSEC) 40 MG capsule, , Disp: , Rfl:     Allergies:   No Known Allergies    Objective     Physical Exam:  Vital Signs:   Vitals:    23 1514   BP: 166/90   Pulse: 66   SpO2: 98%   Weight: 78.5 kg (173 lb)   Height: 160 cm (63\")     Body mass index is 30.65 kg/m².     Physical Exam  Vitals and nursing note reviewed.   Constitutional:       General: She is not in acute distress.     Appearance: Normal appearance. She is not ill-appearing.   HENT:      Head: Normocephalic and atraumatic.      Right Ear: Tympanic membrane and ear canal normal.      Left Ear: Tympanic membrane and ear canal normal.      Nose: Nose normal.   Cardiovascular:      Rate and Rhythm: Normal rate and regular rhythm.      Heart sounds: Normal heart sounds.   Pulmonary:      Effort: Pulmonary effort is normal.      Breath sounds: Normal breath sounds.   Neurological:      Mental " Status: She is alert and oriented to person, place, and time. Mental status is at baseline.   Psychiatric:         Mood and Affect: Mood normal.         Procedures      Assessment / Plan      Assessment/Plan:   Diagnoses and all orders for this visit:    1. Paresthesia (Primary)  -     TSH; Future  -     Vitamin B12; Future  -     TSH  -     Vitamin B12    2. Benign hypertension  -     CBC Auto Differential; Future  -     Comprehensive Metabolic Panel; Future  -     Lipid Panel; Future  -     CBC Auto Differential  -     Comprehensive Metabolic Panel  -     Lipid Panel    3. Acquired hypothyroidism  -     TSH; Future  -     TSH    4. Anxiety    Other orders  -     amLODIPine (NORVASC) 10 MG tablet; Take 1 tablet by mouth Daily.  Dispense: 90 tablet; Refill: 1  -     levothyroxine (SYNTHROID, LEVOTHROID) 112 MCG tablet; Take 1 tablet by mouth Daily.  Dispense: 90 tablet; Refill: 1  -     triamterene-hydrochlorothiazide (MAXZIDE-25) 37.5-25 MG per tablet; Take 1 tablet by mouth Daily.  Dispense: 90 tablet; Refill: 1  -     traZODone (DESYREL) 50 MG tablet; Take 1-2 tablets by mouth At Night As Needed for Sleep.  Dispense: 180 tablet; Refill: 1  -     sertraline (ZOLOFT) 50 MG tablet; Take 1 tablet by mouth Daily.  Dispense: 90 tablet; Refill: 1         Refill chronic medications and needed refill today.  Did check blood work because of her current symptoms with paresthesias.    Follow Up:   No follow-ups on file.    Mello Ronquillo  [facility] [place]

## 2023-02-11 LAB
ALBUMIN SERPL-MCNC: 4.5 G/DL (ref 3.8–4.8)
ALBUMIN/GLOB SERPL: 2.4 {RATIO} (ref 1.2–2.2)
ALP SERPL-CCNC: 71 IU/L (ref 44–121)
ALT SERPL-CCNC: 10 IU/L (ref 0–32)
AST SERPL-CCNC: 17 IU/L (ref 0–40)
BILIRUB SERPL-MCNC: <0.2 MG/DL (ref 0–1.2)
BUN SERPL-MCNC: 19 MG/DL (ref 8–27)
BUN/CREAT SERPL: 18 (ref 12–28)
CALCIUM SERPL-MCNC: 9.6 MG/DL (ref 8.7–10.3)
CHLORIDE SERPL-SCNC: 101 MMOL/L (ref 96–106)
CHOLEST SERPL-MCNC: 277 MG/DL (ref 100–199)
CO2 SERPL-SCNC: 22 MMOL/L (ref 20–29)
CREAT SERPL-MCNC: 1.04 MG/DL (ref 0.57–1)
EGFRCR SERPLBLD CKD-EPI 2021: 60 ML/MIN/1.73
GLOBULIN SER CALC-MCNC: 1.9 G/DL (ref 1.5–4.5)
GLUCOSE SERPL-MCNC: ABNORMAL MG/DL
HDLC SERPL-MCNC: 62 MG/DL
LDLC SERPL CALC-MCNC: 173 MG/DL (ref 0–99)
POTASSIUM SERPL-SCNC: ABNORMAL MMOL/L
PROT SERPL-MCNC: 6.4 G/DL (ref 6–8.5)
SODIUM SERPL-SCNC: 140 MMOL/L (ref 134–144)
TRIGL SERPL-MCNC: 224 MG/DL (ref 0–149)
TSH SERPL DL<=0.005 MIU/L-ACNC: 5 UIU/ML (ref 0.45–4.5)
VIT B12 SERPL-MCNC: 678 PG/ML (ref 232–1245)
VLDLC SERPL CALC-MCNC: 42 MG/DL (ref 5–40)

## 2023-02-14 ENCOUNTER — TELEPHONE (OUTPATIENT)
Dept: FAMILY MEDICINE CLINIC | Facility: CLINIC | Age: 63
End: 2023-02-14
Payer: MEDICARE

## 2023-02-14 DIAGNOSIS — E03.9 ACQUIRED HYPOTHYROIDISM: Primary | ICD-10-CM

## 2023-02-14 NOTE — TELEPHONE ENCOUNTER
Caller: Nat Candelaria    Relationship: Self    Best call back number: [phone]    Caller requesting test results: SELF    What test was performed: LABS    When was the test performed: LAST THURSDAY    Where was the test performed: IN OFFICE    Additional notes: SAW RESULTS IN MY CHART AND FEELS LIKE SHE NEEDS TO SPEAK TO  REGARDING THEM. PLEASE CALL.

## 2023-02-15 NOTE — TELEPHONE ENCOUNTER
Spoke with patient. She was still having internal shaking and all the symptoms she discussed at her appointment. Her thyroid was abnormal so she wanted you to look over her labs. She also would like a referral for endo

## 2023-02-17 NOTE — TELEPHONE ENCOUNTER
Kidney function is stable as is liver function.  Cholesterol is much higher than we would like.  We probably need to consider doing something with medication.  Appears dose of thyroid medication is probably a little low.  We can make an appointment with endocrinology if she would like or we can do some adjustments.  Looks like overall she needs to increase the dose slightly.

## 2023-02-17 NOTE — TELEPHONE ENCOUNTER
Patient does want the endo referral. She does want you to go ahead and send in a new dose of thyroid medication. She is wanting to know what you think about the internal shaking. Is this her thyroid? Is there anything that can be done?

## 2023-02-19 RX ORDER — LEVOTHYROXINE SODIUM 0.12 MG/1
125 TABLET ORAL DAILY
Qty: 90 TABLET | Refills: 1 | Status: SHIPPED | OUTPATIENT
Start: 2023-02-19

## 2023-02-19 NOTE — TELEPHONE ENCOUNTER
Made the referral to endocrinology.  Sent in the next higher strength of the thyroid medication.  Recheck level in 6 to 8 weeks.  Would not be causing the shaking she is referring to.  This could be related to tension or stress.  Lets wait and see endocrinology first.

## 2023-02-20 ENCOUNTER — PATIENT MESSAGE (OUTPATIENT)
Dept: FAMILY MEDICINE CLINIC | Facility: CLINIC | Age: 63
End: 2023-02-20
Payer: MEDICARE

## 2023-02-20 RX ORDER — TRIAMTERENE AND HYDROCHLOROTHIAZIDE 37.5; 25 MG/1; MG/1
TABLET ORAL
Qty: 30 TABLET | OUTPATIENT
Start: 2023-02-20

## 2023-04-25 ENCOUNTER — TELEPHONE (OUTPATIENT)
Dept: FAMILY MEDICINE CLINIC | Facility: CLINIC | Age: 63
End: 2023-04-25
Payer: MEDICARE

## 2023-04-25 NOTE — TELEPHONE ENCOUNTER
Caller: Nat Candelaria    Relationship: Self    Best call back number: [phone]    What orders are you requesting (i.e. lab or imaging): MRI     In what timeframe would the patient need to come in: ASA    Where will you receive your lab/imaging services: [facility]    PHONE: [phone]    Additional notes:     PATIENT ADVISED ER DOCTOR RECOMMENDED FOR PATIENT TO HAVE AN MRI    PATIENT HAS HAD STOMACH PAIN, DIARRHEA, RIGHT SIDE PAIN, AND VOMITING    CT SCAN SHOWED ENLARGED MASS AREA AT HEAD OF PANCREAS

## 2023-04-26 NOTE — TELEPHONE ENCOUNTER
Please see where she went to the ER.  Get records.  Let her know I need to review these to figure out the best test that we need.  Try to get everything ASAP.

## 2023-04-27 ENCOUNTER — OFFICE VISIT (OUTPATIENT)
Dept: FAMILY MEDICINE CLINIC | Facility: CLINIC | Age: 63
End: 2023-04-27
Payer: MEDICARE

## 2023-04-27 VITALS
BODY MASS INDEX: 30.3 KG/M2 | SYSTOLIC BLOOD PRESSURE: 124 MMHG | DIASTOLIC BLOOD PRESSURE: 74 MMHG | HEART RATE: 75 BPM | HEIGHT: 63 IN | OXYGEN SATURATION: 98 % | WEIGHT: 171 LBS

## 2023-04-27 DIAGNOSIS — K86.89 PANCREATIC MASS: ICD-10-CM

## 2023-04-27 DIAGNOSIS — R74.8 ELEVATED LIVER ENZYMES: Primary | ICD-10-CM

## 2023-04-27 DIAGNOSIS — E03.9 ACQUIRED HYPOTHYROIDISM: ICD-10-CM

## 2023-04-27 PROCEDURE — 3078F DIAST BP <80 MM HG: CPT | Performed by: FAMILY MEDICINE

## 2023-04-27 PROCEDURE — 3074F SYST BP LT 130 MM HG: CPT | Performed by: FAMILY MEDICINE

## 2023-04-27 PROCEDURE — 99214 OFFICE O/P EST MOD 30 MIN: CPT | Performed by: FAMILY MEDICINE

## 2023-04-27 RX ORDER — HYDROCODONE BITARTRATE AND ACETAMINOPHEN 5; 325 MG/1; MG/1
1 TABLET ORAL EVERY 6 HOURS PRN
Qty: 15 TABLET | Refills: 0 | Status: SHIPPED | OUTPATIENT
Start: 2023-04-27

## 2023-04-27 RX ORDER — PROMETHAZINE HYDROCHLORIDE 25 MG/1
25 TABLET ORAL EVERY 6 HOURS PRN
Qty: 30 TABLET | Refills: 0 | Status: SHIPPED | OUTPATIENT
Start: 2023-04-27

## 2023-04-27 NOTE — PROGRESS NOTES
Follow Up Office Visit      Date of Visit:  2023   Patient Name: Nat Candelaria  : 1960   MRN: [id number]     Chief Complaint:    Chief Complaint   Patient presents with   • Abdominal Pain     ER follow up       History of Present Illness: Nat Candelaria is a 62 y.o. female who is here today for follow up.  Patient following up on recent ER visit.  Was seen for nausea vomiting and right-sided abdominal discomfort.  Had mildly elevated liver enzymes and a questionable enlargement of her pancreatic duct.  Imaging suggested MRCP for further evaluation.        Subjective      Review of Systems:   Review of Systems   Constitutional: Negative for fatigue and fever.   HENT: Negative for congestion and ear pain.    Respiratory: Negative for apnea, cough, chest tightness and shortness of breath.    Cardiovascular: Negative for chest pain.   Gastrointestinal: Negative for abdominal pain, constipation, diarrhea and nausea.   Musculoskeletal: Negative for arthralgias.   Psychiatric/Behavioral: Negative for depressed mood and stress.       Past Medical History:   Past Medical History:   Diagnosis Date   • Acquired hypothyroidism    • Benign hypertension    • Degenerative joint disease involving multiple joints    • Diverticulosis    • Mixed hyperlipidemia    • Obesity     CLASS 1 OBESITY DUE TO EXCDSS CALORIES WITH SERIOUS COMORBIDITY AND BODY MASS INDEX(BMI) OF 31.0 TO 31.9 IN ADULT   • Primary insomnia    • Primary osteoarthritis involving multiple joints    • Recurrent major depressive episodes, mild        Past Surgical History:   Past Surgical History:   Procedure Laterality Date   • HYSTERECTOMY     • SPINE SURGERY      X3       Family History:   Family History   Problem Relation Age of Onset   • Coronary artery disease Mother    • Breast cancer Mother    • Lung cancer Mother    • Coronary artery disease Father    • Atrial fibrillation Sister        Social History:   Social History     Socioeconomic History  "  • Marital status:    • Number of children: 1   • Highest education level: 12th grade   Tobacco Use   • Smoking status: Former     Packs/day: 1.00     Years: 16.00     Pack years: 16.00     Types: Cigarettes     Quit date:      Years since quittin.3   • Smokeless tobacco: Never   Vaping Use   • Vaping Use: Never used   Substance and Sexual Activity   • Alcohol use: Not Currently     Comment: rarely   • Drug use: Defer   • Sexual activity: Defer       Medications:     Current Outpatient Medications:   •  amLODIPine (NORVASC) 10 MG tablet, Take 1 tablet by mouth Daily., Disp: 90 tablet, Rfl: 1  •  HYDROcodone-acetaminophen (NORCO) 5-325 MG per tablet, Take 1 tablet by mouth Every 6 (Six) Hours As Needed for Moderate Pain., Disp: 15 tablet, Rfl: 0  •  levothyroxine (SYNTHROID, LEVOTHROID) 125 MCG tablet, Take 1 tablet by mouth Daily., Disp: 90 tablet, Rfl: 1  •  omeprazole (priLOSEC) 40 MG capsule, , Disp: , Rfl:   •  promethazine (PHENERGAN) 25 MG tablet, Take 1 tablet by mouth Every 6 (Six) Hours As Needed for Nausea or Vomiting., Disp: 30 tablet, Rfl: 0  •  sertraline (ZOLOFT) 50 MG tablet, Take 1 tablet by mouth Daily., Disp: 90 tablet, Rfl: 1  •  traZODone (DESYREL) 50 MG tablet, Take 1-2 tablets by mouth At Night As Needed for Sleep., Disp: 180 tablet, Rfl: 1  •  triamterene-hydrochlorothiazide (MAXZIDE-25) 37.5-25 MG per tablet, Take 1 tablet by mouth Daily., Disp: 90 tablet, Rfl: 1    Allergies:   No Known Allergies    Objective     Physical Exam:  Vital Signs:   Vitals:    23 1406   BP: 124/74   Pulse: 75   SpO2: 98%   Weight: 77.6 kg (171 lb)   Height: 160 cm (63\")     Body mass index is 30.29 kg/m².     Physical Exam  Vitals and nursing note reviewed.   Constitutional:       General: She is not in acute distress.     Appearance: Normal appearance. She is not ill-appearing.   HENT:      Head: Normocephalic and atraumatic.      Right Ear: Tympanic membrane and ear canal normal.      Left " Ear: Tympanic membrane and ear canal normal.      Nose: Nose normal.   Cardiovascular:      Rate and Rhythm: Normal rate and regular rhythm.      Heart sounds: Normal heart sounds.   Pulmonary:      Effort: Pulmonary effort is normal.      Breath sounds: Normal breath sounds.   Neurological:      Mental Status: She is alert and oriented to person, place, and time. Mental status is at baseline.   Psychiatric:         Mood and Affect: Mood normal.         Procedures      Assessment / Plan      Assessment/Plan:   Diagnoses and all orders for this visit:    1. Elevated liver enzymes (Primary)  -     Cancel: MRI abdomen w contrast mrcp; Future  -     MRI abdomen w wo contrast mrcp; Future  -     Comprehensive Metabolic Panel; Future  -     Comprehensive Metabolic Panel    2. Pancreatic mass  -     Cancel: MRI abdomen w contrast mrcp; Future  -     MRI abdomen w wo contrast mrcp; Future  -     Comprehensive Metabolic Panel; Future  -     HYDROcodone-acetaminophen (NORCO) 5-325 MG per tablet; Take 1 tablet by mouth Every 6 (Six) Hours As Needed for Moderate Pain.  Dispense: 15 tablet; Refill: 0  -     Comprehensive Metabolic Panel    3. Acquired hypothyroidism  -     TSH; Future  -     TSH    Other orders  -     promethazine (PHENERGAN) 25 MG tablet; Take 1 tablet by mouth Every 6 (Six) Hours As Needed for Nausea or Vomiting.  Dispense: 30 tablet; Refill: 0         Order MRCP for patient.  Gave medication for symptom management for now.  Give medications for nausea and discomfort.  She will let us know if she has worsening symptoms.  While here we did recheck her thyroid that was needed from her previous visit.    Follow Up:   No follow-ups on file.    Mello Ronquillo  [facility] [place] [facility] [place]

## 2023-04-27 NOTE — TELEPHONE ENCOUNTER
Patient was contacted on 04/26/2023, Ronquillo reviewed ER records and decided to order the abdominal MRI with MRCP, Patient has an appt today and will be notified of appt

## 2023-04-28 LAB
ALBUMIN SERPL-MCNC: 4.3 G/DL (ref 3.8–4.8)
ALBUMIN/GLOB SERPL: 2.4 {RATIO} (ref 1.2–2.2)
ALP SERPL-CCNC: 98 IU/L (ref 44–121)
ALT SERPL-CCNC: 64 IU/L (ref 0–32)
AST SERPL-CCNC: 27 IU/L (ref 0–40)
BILIRUB SERPL-MCNC: 0.4 MG/DL (ref 0–1.2)
BUN SERPL-MCNC: 17 MG/DL (ref 8–27)
BUN/CREAT SERPL: 18 (ref 12–28)
CALCIUM SERPL-MCNC: 9.2 MG/DL (ref 8.7–10.3)
CHLORIDE SERPL-SCNC: 98 MMOL/L (ref 96–106)
CO2 SERPL-SCNC: 25 MMOL/L (ref 20–29)
CREAT SERPL-MCNC: 0.94 MG/DL (ref 0.57–1)
EGFRCR SERPLBLD CKD-EPI 2021: 68 ML/MIN/1.73
GLOBULIN SER CALC-MCNC: 1.8 G/DL (ref 1.5–4.5)
GLUCOSE SERPL-MCNC: 106 MG/DL (ref 70–99)
POTASSIUM SERPL-SCNC: 3.7 MMOL/L (ref 3.5–5.2)
PROT SERPL-MCNC: 6.1 G/DL (ref 6–8.5)
SODIUM SERPL-SCNC: 137 MMOL/L (ref 134–144)
TSH SERPL DL<=0.005 MIU/L-ACNC: 1.47 UIU/ML (ref 0.45–4.5)

## 2023-05-03 ENCOUNTER — TELEPHONE (OUTPATIENT)
Dept: FAMILY MEDICINE CLINIC | Facility: CLINIC | Age: 63
End: 2023-05-03
Payer: MEDICARE

## 2023-05-03 NOTE — TELEPHONE ENCOUNTER
Caller: Terrell Candelariaia    Relationship: Self    Best call back number: [phone]    What is the best time to reach you: ANYTIME    Who are you requesting to speak with (clinical staff, provider,  specific staff member): CLINICAL    What was the call regarding: PATENT IS HAVING MRI ON 5/11/23 THAT YOU ORDERED FOR HER. SHE SAID YOU ADVISED HER THAT YOU WANTED TO SEE HER THE NEXT DAY TO REVIEW THOSE RESULTS.   YOU DON'T HAVE ANY OPENINGS UNTIL 5/24/23.  PLEASE ADVISE PATIENT HOW YOU WILL REVIEW THOSE WITH HER.  THANK YOU.    Do you require a callback: YES, PLEASE

## 2023-05-08 ENCOUNTER — OFFICE VISIT (OUTPATIENT)
Dept: FAMILY MEDICINE CLINIC | Facility: CLINIC | Age: 63
End: 2023-05-08
Payer: MEDICARE

## 2023-05-08 VITALS
HEIGHT: 63 IN | OXYGEN SATURATION: 97 % | SYSTOLIC BLOOD PRESSURE: 150 MMHG | HEART RATE: 70 BPM | BODY MASS INDEX: 30.3 KG/M2 | WEIGHT: 171 LBS | DIASTOLIC BLOOD PRESSURE: 80 MMHG

## 2023-05-08 DIAGNOSIS — K86.89 PANCREATIC MASS: ICD-10-CM

## 2023-05-08 DIAGNOSIS — R74.8 ELEVATED LIVER ENZYMES: Primary | ICD-10-CM

## 2023-05-08 DIAGNOSIS — J01.90 ACUTE NON-RECURRENT SINUSITIS, UNSPECIFIED LOCATION: ICD-10-CM

## 2023-05-08 DIAGNOSIS — R10.9 ABDOMINAL PAIN, UNSPECIFIED ABDOMINAL LOCATION: ICD-10-CM

## 2023-05-08 PROCEDURE — 3079F DIAST BP 80-89 MM HG: CPT | Performed by: FAMILY MEDICINE

## 2023-05-08 PROCEDURE — 3077F SYST BP >= 140 MM HG: CPT | Performed by: FAMILY MEDICINE

## 2023-05-08 PROCEDURE — 99214 OFFICE O/P EST MOD 30 MIN: CPT | Performed by: FAMILY MEDICINE

## 2023-05-08 RX ORDER — ONDANSETRON 4 MG/1
4 TABLET, ORALLY DISINTEGRATING ORAL EVERY 8 HOURS PRN
Qty: 20 TABLET | Refills: 2 | Status: SHIPPED | OUTPATIENT
Start: 2023-05-08

## 2023-05-08 RX ORDER — PREDNISONE 20 MG/1
40 TABLET ORAL DAILY
Qty: 10 TABLET | Refills: 0 | Status: SHIPPED | OUTPATIENT
Start: 2023-05-08 | End: 2023-05-13

## 2023-05-08 RX ORDER — CEFDINIR 300 MG/1
300 CAPSULE ORAL 2 TIMES DAILY
Qty: 20 CAPSULE | Refills: 0 | Status: SHIPPED | OUTPATIENT
Start: 2023-05-08

## 2023-05-08 RX ORDER — OMEPRAZOLE 40 MG/1
40 CAPSULE, DELAYED RELEASE ORAL DAILY
Qty: 30 CAPSULE | Refills: 2 | Status: SHIPPED | OUTPATIENT
Start: 2023-05-08

## 2023-05-09 NOTE — PROGRESS NOTES
Follow Up Office Visit      Date of Visit:  2023   Patient Name: Nat Candelaria  : 1960   MRN: [id number]     Chief Complaint:  No chief complaint on file.      History of Present Illness: Nat Candelaria is a 62 y.o. female who is here today for follow up.  Patient following up on recent MRCP.  Test unremarkable.  We need to recheck her liver enzymes to see if they continue to come down.  Still with some abdominal discomfort mild.  Some nausea and acid reflux symptoms.  Patient also current with sinus infection.        Subjective      Review of Systems:   Review of Systems   Constitutional: Negative for fatigue and fever.   HENT: Negative for congestion and ear pain.    Respiratory: Negative for apnea, cough, chest tightness and shortness of breath.    Cardiovascular: Negative for chest pain.   Gastrointestinal: Positive for nausea and indigestion. Negative for abdominal pain, constipation and diarrhea.   Musculoskeletal: Negative for arthralgias.   Psychiatric/Behavioral: Negative for depressed mood and stress.       Past Medical History:   Past Medical History:   Diagnosis Date   • Acquired hypothyroidism    • Benign hypertension    • Degenerative joint disease involving multiple joints    • Diverticulosis    • Mixed hyperlipidemia    • Obesity     CLASS 1 OBESITY DUE TO EXCDSS CALORIES WITH SERIOUS COMORBIDITY AND BODY MASS INDEX(BMI) OF 31.0 TO 31.9 IN ADULT   • Primary insomnia    • Primary osteoarthritis involving multiple joints    • Recurrent major depressive episodes, mild        Past Surgical History:   Past Surgical History:   Procedure Laterality Date   • HYSTERECTOMY     • SPINE SURGERY      X3       Family History:   Family History   Problem Relation Age of Onset   • Coronary artery disease Mother    • Breast cancer Mother    • Lung cancer Mother    • Coronary artery disease Father    • Atrial fibrillation Sister        Social History:   Social History     Socioeconomic History   •  Marital status:    • Number of children: 1   • Highest education level: 12th grade   Tobacco Use   • Smoking status: Former     Packs/day: 1.00     Years: 16.00     Pack years: 16.00     Types: Cigarettes     Quit date:      Years since quittin.3   • Smokeless tobacco: Never   Vaping Use   • Vaping Use: Never used   Substance and Sexual Activity   • Alcohol use: Not Currently     Comment: rarely   • Drug use: Defer   • Sexual activity: Defer       Medications:     Current Outpatient Medications:   •  omeprazole (priLOSEC) 40 MG capsule, Take 1 capsule by mouth Daily., Disp: 30 capsule, Rfl: 2  •  amLODIPine (NORVASC) 10 MG tablet, Take 1 tablet by mouth Daily., Disp: 90 tablet, Rfl: 1  •  cefdinir (OMNICEF) 300 MG capsule, Take 1 capsule by mouth 2 (Two) Times a Day., Disp: 20 capsule, Rfl: 0  •  HYDROcodone-acetaminophen (NORCO) 5-325 MG per tablet, Take 1 tablet by mouth Every 6 (Six) Hours As Needed for Moderate Pain., Disp: 15 tablet, Rfl: 0  •  levothyroxine (SYNTHROID, LEVOTHROID) 125 MCG tablet, Take 1 tablet by mouth Daily., Disp: 90 tablet, Rfl: 1  •  ondansetron ODT (ZOFRAN-ODT) 4 MG disintegrating tablet, Place 1 tablet on the tongue Every 8 (Eight) Hours As Needed for Nausea or Vomiting., Disp: 20 tablet, Rfl: 2  •  predniSONE (DELTASONE) 20 MG tablet, Take 2 tablets by mouth Daily for 5 days., Disp: 10 tablet, Rfl: 0  •  promethazine (PHENERGAN) 25 MG tablet, Take 1 tablet by mouth Every 6 (Six) Hours As Needed for Nausea or Vomiting., Disp: 30 tablet, Rfl: 0  •  sertraline (ZOLOFT) 50 MG tablet, Take 1 tablet by mouth Daily., Disp: 90 tablet, Rfl: 1  •  traZODone (DESYREL) 50 MG tablet, Take 1-2 tablets by mouth At Night As Needed for Sleep., Disp: 180 tablet, Rfl: 1  •  triamterene-hydrochlorothiazide (MAXZIDE-25) 37.5-25 MG per tablet, Take 1 tablet by mouth Daily., Disp: 90 tablet, Rfl: 1    Allergies:   No Known Allergies    Objective     Physical Exam:  Vital Signs:   Vitals:     "05/08/23 1530   BP: 150/80   Pulse: 70   SpO2: 97%   Weight: 77.6 kg (171 lb)   Height: 160 cm (63\")     Body mass index is 30.29 kg/m².     Physical Exam  Vitals and nursing note reviewed.   Constitutional:       General: She is not in acute distress.     Appearance: Normal appearance. She is not ill-appearing.   HENT:      Head: Normocephalic and atraumatic.      Right Ear: Tympanic membrane and ear canal normal.      Left Ear: Tympanic membrane and ear canal normal.      Nose: Nose normal.   Cardiovascular:      Rate and Rhythm: Normal rate and regular rhythm.      Heart sounds: Normal heart sounds.   Pulmonary:      Effort: Pulmonary effort is normal.      Breath sounds: Normal breath sounds.   Neurological:      Mental Status: She is alert and oriented to person, place, and time. Mental status is at baseline.   Psychiatric:         Mood and Affect: Mood normal.         Procedures      Assessment / Plan      Assessment/Plan:   Diagnoses and all orders for this visit:    1. Elevated liver enzymes (Primary)  -     Comprehensive Metabolic Panel; Future  -     Comprehensive Metabolic Panel    2. Pancreatic mass    3. Abdominal pain, unspecified abdominal location    4. Acute non-recurrent sinusitis, unspecified location    Other orders  -     ondansetron ODT (ZOFRAN-ODT) 4 MG disintegrating tablet; Place 1 tablet on the tongue Every 8 (Eight) Hours As Needed for Nausea or Vomiting.  Dispense: 20 tablet; Refill: 2  -     omeprazole (priLOSEC) 40 MG capsule; Take 1 capsule by mouth Daily.  Dispense: 30 capsule; Refill: 2  -     cefdinir (OMNICEF) 300 MG capsule; Take 1 capsule by mouth 2 (Two) Times a Day.  Dispense: 20 capsule; Refill: 0  -     predniSONE (DELTASONE) 20 MG tablet; Take 2 tablets by mouth Daily for 5 days.  Dispense: 10 tablet; Refill: 0         MRCP was negative for pancreatic mass or biliary obstruction.  Symptoms could be more related to GERD.  Lots of stress lately with her  going through " esophageal cancer treatment.  Started omeprazole.  Use Zofran as needed.  Recheck liver enzymes.  Gave antibiotics and steroids for the current sinus infection.    Follow Up:   No follow-ups on file.    Mello Ronquillo  [facility] [place]

## 2023-05-10 LAB
ALBUMIN SERPL-MCNC: 4.3 G/DL (ref 3.8–4.8)
ALBUMIN/GLOB SERPL: 2.2 {RATIO} (ref 1.2–2.2)
ALP SERPL-CCNC: 85 IU/L (ref 44–121)
ALT SERPL-CCNC: 17 IU/L (ref 0–32)
AST SERPL-CCNC: 17 IU/L (ref 0–40)
BILIRUB SERPL-MCNC: <0.2 MG/DL (ref 0–1.2)
BUN SERPL-MCNC: 17 MG/DL (ref 8–27)
BUN/CREAT SERPL: 14 (ref 12–28)
CALCIUM SERPL-MCNC: 9.4 MG/DL (ref 8.7–10.3)
CHLORIDE SERPL-SCNC: 101 MMOL/L (ref 96–106)
CO2 SERPL-SCNC: 25 MMOL/L (ref 20–29)
CREAT SERPL-MCNC: 1.22 MG/DL (ref 0.57–1)
EGFRCR SERPLBLD CKD-EPI 2021: 50 ML/MIN/1.73
GLOBULIN SER CALC-MCNC: 2 G/DL (ref 1.5–4.5)
GLUCOSE SERPL-MCNC: 89 MG/DL (ref 70–99)
POTASSIUM SERPL-SCNC: 4.3 MMOL/L (ref 3.5–5.2)
PROT SERPL-MCNC: 6.3 G/DL (ref 6–8.5)
SODIUM SERPL-SCNC: 141 MMOL/L (ref 134–144)

## 2023-05-16 ENCOUNTER — PATIENT MESSAGE (OUTPATIENT)
Dept: FAMILY MEDICINE CLINIC | Facility: CLINIC | Age: 63
End: 2023-05-16
Payer: MEDICARE

## 2023-05-17 RX ORDER — SERTRALINE HYDROCHLORIDE 100 MG/1
100 TABLET, FILM COATED ORAL DAILY
Qty: 90 TABLET | Refills: 1 | Status: SHIPPED | OUTPATIENT
Start: 2023-05-17

## 2023-05-17 NOTE — TELEPHONE ENCOUNTER
From: Nat Candelaria  To: Mello Ronquillo  Sent: 5/16/2023 11:24 AM EDT  Subject: Question regarding COMPREHENSIVE METABOLIC PANEL    Is there any need for follow up blood work? Could you increase my dosage on my antidepressant please, I'm really struggling, Thank You

## 2023-05-24 ENCOUNTER — OFFICE VISIT (OUTPATIENT)
Dept: FAMILY MEDICINE CLINIC | Facility: CLINIC | Age: 63
End: 2023-05-24
Payer: MEDICARE

## 2023-05-24 VITALS
SYSTOLIC BLOOD PRESSURE: 120 MMHG | OXYGEN SATURATION: 97 % | BODY MASS INDEX: 30.51 KG/M2 | HEART RATE: 71 BPM | HEIGHT: 63 IN | DIASTOLIC BLOOD PRESSURE: 70 MMHG | WEIGHT: 172.19 LBS

## 2023-05-24 DIAGNOSIS — R21 RASH: Primary | ICD-10-CM

## 2023-05-24 DIAGNOSIS — R10.10 UPPER ABDOMINAL PAIN: ICD-10-CM

## 2023-05-24 PROCEDURE — 99213 OFFICE O/P EST LOW 20 MIN: CPT | Performed by: NURSE PRACTITIONER

## 2023-05-24 PROCEDURE — 3074F SYST BP LT 130 MM HG: CPT | Performed by: NURSE PRACTITIONER

## 2023-05-24 PROCEDURE — 1160F RVW MEDS BY RX/DR IN RCRD: CPT | Performed by: NURSE PRACTITIONER

## 2023-05-24 PROCEDURE — 3078F DIAST BP <80 MM HG: CPT | Performed by: NURSE PRACTITIONER

## 2023-05-24 PROCEDURE — 1159F MED LIST DOCD IN RCRD: CPT | Performed by: NURSE PRACTITIONER

## 2023-05-24 RX ORDER — SUCRALFATE 1 G/1
1 TABLET ORAL 4 TIMES DAILY
Qty: 20 TABLET | Refills: 0 | Status: SHIPPED | OUTPATIENT
Start: 2023-05-24

## 2023-05-24 RX ORDER — CYCLOBENZAPRINE HCL 5 MG
5 TABLET ORAL 3 TIMES DAILY
COMMUNITY
Start: 2023-03-20

## 2023-05-24 RX ORDER — TRIAMCINOLONE ACETONIDE 1 MG/G
1 CREAM TOPICAL 2 TIMES DAILY PRN
Qty: 45 G | Refills: 0 | Status: SHIPPED | OUTPATIENT
Start: 2023-05-24

## 2023-05-24 NOTE — PROGRESS NOTES
"Chief Complaint  Abdominal Pain    Subjective          Nat Candelaria presents to [facility]  History of Present Illness     Patient presents with continued concerns of upper abdominal pain.  She has been seeing Dr. Ronquillo regarding this.  They have completed an MRCP which was unremarkable.  She states she has pain in the upper abdomen and over into the right upper quadrant.  She states it is worse after she eats.  No vomiting.  No diarrhea no constipation.  No urinary issues.  Is requesting a referral to GI for further evaluation.    She also states she has continued to have an on and off rash type area present to bilateral lower legs.  States she spoke with Dr. Ronquillo about this in the past and he was concerned that it could be some type of vascular rash.  She states when she took a course of prednisone recently it cleared up but it seems like it may be starting to come back and is wondering if she should see someone for it.  States at times it does itch.  States it is a red like rash present to bilateral lower legs.  No swelling.  No fever no chills no body aches.    Objective   Vital Signs:   /70   Pulse 71   Ht 160 cm (63\")   Wt 78.1 kg (172 lb 3 oz)   SpO2 97%   BMI 30.50 kg/m²     Body mass index is 30.5 kg/m².    Review of Systems   Constitutional: Negative for fever and unexpected weight loss.   HENT: Negative for congestion.    Respiratory: Negative for cough, shortness of breath and wheezing.    Cardiovascular: Negative.    Gastrointestinal: Positive for abdominal pain, nausea and GERD. Negative for diarrhea and vomiting.   Genitourinary: Negative for decreased urine volume, dysuria, frequency, hematuria and urgency.   Musculoskeletal: Negative for arthralgias, back pain and myalgias.   Skin: Positive for rash.   Neurological: Negative for dizziness, light-headedness and headache.       Past History:  Medical History: has a past medical history of Acquired " hypothyroidism, Benign hypertension, Degenerative joint disease involving multiple joints, Diverticulosis, Mixed hyperlipidemia, Obesity, Primary insomnia, Primary osteoarthritis involving multiple joints, and Recurrent major depressive episodes, mild.   Surgical History: has a past surgical history that includes Hysterectomy and Spine surgery.   Family History: family history includes Atrial fibrillation in her sister; Breast cancer in her mother; Coronary artery disease in her father and mother; Lung cancer in her mother.   Social History: reports that she quit smoking about 31 years ago. Her smoking use included cigarettes. She has a 16.00 pack-year smoking history. She has never used smokeless tobacco. She reports that she does not currently use alcohol. Drug use questions deferred to the physician.    PHQ-2 Depression Screening  Little interest or pleasure in doing things?     Feeling down, depressed, or hopeless?     PHQ-2 Total Score          PHQ-9 Depression Screening  Little interest or pleasure in doing things?     Feeling down, depressed, or hopeless?     Trouble falling or staying asleep, or sleeping too much?     Feeling tired or having little energy?     Poor appetite or overeating?     Feeling bad about yourself - or that you are a failure or have let yourself or your family down?     Trouble concentrating on things, such as reading the newspaper or watching television?     Moving or speaking so slowly that other people could have noticed? Or the opposite - being so fidgety or restless that you have been moving around a lot more than usual?     Thoughts that you would be better off dead, or of hurting yourself in some way?     PHQ-9 Total Score     If you checked off any problems, how difficult have these problems made it for you to do your work, take care of things at home, or get along with other people?       PHQ-9 Total Score:        Patient screened positive for depression based on a PHQ-9 score  of 0 on 4/27/2023. Follow-up recommendations include:          Current Outpatient Medications:   •  amLODIPine (NORVASC) 10 MG tablet, Take 1 tablet by mouth Daily., Disp: 90 tablet, Rfl: 1  •  cyclobenzaprine (FLEXERIL) 5 MG tablet, Take 1 tablet by mouth 3 (Three) Times a Day., Disp: , Rfl:   •  HYDROcodone-acetaminophen (NORCO) 5-325 MG per tablet, Take 1 tablet by mouth Every 6 (Six) Hours As Needed for Moderate Pain., Disp: 15 tablet, Rfl: 0  •  levothyroxine (SYNTHROID, LEVOTHROID) 125 MCG tablet, Take 1 tablet by mouth Daily., Disp: 90 tablet, Rfl: 1  •  omeprazole (priLOSEC) 40 MG capsule, Take 1 capsule by mouth Daily., Disp: 30 capsule, Rfl: 2  •  ondansetron ODT (ZOFRAN-ODT) 4 MG disintegrating tablet, Place 1 tablet on the tongue Every 8 (Eight) Hours As Needed for Nausea or Vomiting., Disp: 20 tablet, Rfl: 2  •  promethazine (PHENERGAN) 25 MG tablet, Take 1 tablet by mouth Every 6 (Six) Hours As Needed for Nausea or Vomiting., Disp: 30 tablet, Rfl: 0  •  sertraline (ZOLOFT) 50 MG tablet, , Disp: , Rfl:   •  traZODone (DESYREL) 50 MG tablet, Take 1-2 tablets by mouth At Night As Needed for Sleep., Disp: 180 tablet, Rfl: 1  •  triamterene-hydrochlorothiazide (MAXZIDE-25) 37.5-25 MG per tablet, Take 1 tablet by mouth Daily., Disp: 90 tablet, Rfl: 1  •  sucralfate (Carafate) 1 g tablet, Take 1 tablet by mouth 4 (Four) Times a Day., Disp: 20 tablet, Rfl: 0  •  triamcinolone (KENALOG) 0.1 % cream, Apply 1 application topically to the appropriate area as directed 2 (Two) Times a Day As Needed for Rash., Disp: 45 g, Rfl: 0   (Not in a hospital admission)     Allergies: Patient has no known allergies.    Physical Exam  Constitutional:       Appearance: Normal appearance.   Eyes:      Conjunctiva/sclera: Conjunctivae normal.      Pupils: Pupils are equal, round, and reactive to light.   Cardiovascular:      Rate and Rhythm: Normal rate and regular rhythm.      Heart sounds: Normal heart sounds.   Pulmonary:       Effort: Pulmonary effort is normal.      Breath sounds: Normal breath sounds.   Abdominal:      General: Abdomen is flat. Bowel sounds are normal. There is no distension.      Palpations: Abdomen is soft.      Tenderness: There is no abdominal tenderness. There is no guarding or rebound.   Musculoskeletal:         General: No swelling.      Right lower leg: No edema.      Left lower leg: No edema.   Skin:     Findings: Rash present.      Comments: Small rash like area present to right lower leg more medial. Non-tender. Almost has appearance of random blood vessel spots under the skin. No open skin    Neurological:      General: No focal deficit present.      Mental Status: She is alert and oriented to person, place, and time. Mental status is at baseline.   Psychiatric:         Mood and Affect: Mood normal.         Behavior: Behavior normal.         Thought Content: Thought content normal.         Judgment: Judgment normal.          Result Review :                   Assessment and Plan    Diagnoses and all orders for this visit:    1. Rash (Primary)  Assessment & Plan:  Topical steroid as prescribed as needed as she states it does itch at times.  Education provided.  Risk of meds discussed understood. Due to apperance and possible vascular type rash I am going to go ahead and refer her to vascular surgery for further evaluation. States it is minimal so likely does not need another round of PO steroids at this time. She knows to return to clinic or ED though if worsens. Return to clinic or ED with any issues or concerns.    Orders:  -     triamcinolone (KENALOG) 0.1 % cream; Apply 1 application topically to the appropriate area as directed 2 (Two) Times a Day As Needed for Rash.  Dispense: 45 g; Refill: 0  -     Ambulatory Referral to Vascular Surgery    2. Upper abdominal pain  Assessment & Plan:  Patient denies further blood work.  She just wants GI referral.  I will place GI referral.  Informed her this possibly  could be related to GERD and/or an ulcer so encouraged her for the next week to take her omeprazole twice daily and we will add on a 5-day course of sucralfate.  Risk discussed understood.  She knows to go to the hospital if worsens.  Stay hydrated with water.  Education provided.  Return to clinic or ED with any issues or concerns.    Orders:  -     Ambulatory Referral to Gastroenterology  -     sucralfate (Carafate) 1 g tablet; Take 1 tablet by mouth 4 (Four) Times a Day.  Dispense: 20 tablet; Refill: 0            BMI is >= 30 and <35. (Class 1 Obesity). The following options were offered after discussion;: exercise counseling/recommendations and nutrition counseling/recommendations       Follow Up   Return if symptoms worsen or fail to improve.  Patient was given instructions and counseling regarding her condition or for health maintenance advice. Please see specific information pulled into the AVS if appropriate.     SABRINA Salmon

## 2023-05-24 NOTE — ASSESSMENT & PLAN NOTE
Patient denies further blood work.  She just wants GI referral.  I will place GI referral.  Informed her this possibly could be related to GERD and/or an ulcer so encouraged her for the next week to take her omeprazole twice daily and we will add on a 5-day course of sucralfate.  Risk discussed understood.  She knows to go to the hospital if worsens.  Stay hydrated with water.  Education provided.  Return to clinic or ED with any issues or concerns.

## 2023-05-24 NOTE — ASSESSMENT & PLAN NOTE
Topical steroid as prescribed as needed as she states it does itch at times.  Education provided.  Risk of meds discussed understood. Due to apperance and possible vascular type rash I am going to go ahead and refer her to vascular surgery for further evaluation. States it is minimal so likely does not need another round of PO steroids at this time. She knows to return to clinic or ED though if worsens. Return to clinic or ED with any issues or concerns.

## 2023-06-06 ENCOUNTER — OFFICE VISIT (OUTPATIENT)
Dept: GASTROENTEROLOGY | Facility: CLINIC | Age: 63
End: 2023-06-06
Payer: MEDICARE

## 2023-06-06 ENCOUNTER — LAB (OUTPATIENT)
Dept: LAB | Facility: HOSPITAL | Age: 63
End: 2023-06-06
Payer: MEDICARE

## 2023-06-06 VITALS
SYSTOLIC BLOOD PRESSURE: 118 MMHG | HEART RATE: 71 BPM | DIASTOLIC BLOOD PRESSURE: 82 MMHG | WEIGHT: 172.4 LBS | HEIGHT: 63 IN | OXYGEN SATURATION: 100 % | BODY MASS INDEX: 30.55 KG/M2 | TEMPERATURE: 97.3 F

## 2023-06-06 DIAGNOSIS — R10.11 RUQ PAIN: Primary | ICD-10-CM

## 2023-06-06 DIAGNOSIS — R68.81 EARLY SATIETY: ICD-10-CM

## 2023-06-06 DIAGNOSIS — R74.8 ELEVATED LIVER ENZYMES: ICD-10-CM

## 2023-06-06 DIAGNOSIS — R10.11 RUQ PAIN: ICD-10-CM

## 2023-06-06 LAB
ALBUMIN SERPL-MCNC: 4.6 G/DL (ref 3.5–5.2)
ALBUMIN/GLOB SERPL: 1.9 G/DL
ALP SERPL-CCNC: 84 U/L (ref 39–117)
ALT SERPL W P-5'-P-CCNC: 30 U/L (ref 1–33)
ANION GAP SERPL CALCULATED.3IONS-SCNC: 8 MMOL/L (ref 5–15)
AST SERPL-CCNC: 28 U/L (ref 1–32)
BASOPHILS # BLD MANUAL: 0.06 10*3/MM3 (ref 0–0.2)
BASOPHILS NFR BLD MANUAL: 1 % (ref 0–1.5)
BILIRUB SERPL-MCNC: 0.3 MG/DL (ref 0–1.2)
BUN SERPL-MCNC: 19 MG/DL (ref 8–23)
BUN/CREAT SERPL: 19.6 (ref 7–25)
CALCIUM SPEC-SCNC: 9.2 MG/DL (ref 8.6–10.5)
CHLORIDE SERPL-SCNC: 101 MMOL/L (ref 98–107)
CO2 SERPL-SCNC: 28 MMOL/L (ref 22–29)
CREAT SERPL-MCNC: 0.97 MG/DL (ref 0.57–1)
CRP SERPL-MCNC: <0.3 MG/DL (ref 0–0.5)
DEPRECATED RDW RBC AUTO: 41.9 FL (ref 37–54)
EGFRCR SERPLBLD CKD-EPI 2021: 65.8 ML/MIN/1.73
EOSINOPHIL # BLD MANUAL: 0.06 10*3/MM3 (ref 0–0.4)
EOSINOPHIL NFR BLD MANUAL: 1 % (ref 0.3–6.2)
ERYTHROCYTE [DISTWIDTH] IN BLOOD BY AUTOMATED COUNT: 13.1 % (ref 12.3–15.4)
GLOBULIN UR ELPH-MCNC: 2.4 GM/DL
GLUCOSE SERPL-MCNC: 94 MG/DL (ref 65–99)
HCT VFR BLD AUTO: 40.9 % (ref 34–46.6)
HCV AB SER DONR QL: NORMAL
HGB BLD-MCNC: 13.9 G/DL (ref 12–15.9)
LIPASE SERPL-CCNC: 31 U/L (ref 13–60)
LYMPHOCYTES # BLD MANUAL: 1.98 10*3/MM3 (ref 0.7–3.1)
LYMPHOCYTES NFR BLD MANUAL: 10.1 % (ref 5–12)
MCH RBC QN AUTO: 30 PG (ref 26.6–33)
MCHC RBC AUTO-ENTMCNC: 34 G/DL (ref 31.5–35.7)
MCV RBC AUTO: 88.3 FL (ref 79–97)
MONOCYTES # BLD: 0.64 10*3/MM3 (ref 0.1–0.9)
NEUTROPHILS # BLD AUTO: 3.58 10*3/MM3 (ref 1.7–7)
NEUTROPHILS NFR BLD MANUAL: 56.6 % (ref 42.7–76)
PLAT MORPH BLD: NORMAL
PLATELET # BLD AUTO: 131 10*3/MM3 (ref 140–450)
PMV BLD AUTO: 12.6 FL (ref 6–12)
POTASSIUM SERPL-SCNC: 4.3 MMOL/L (ref 3.5–5.2)
PROT SERPL-MCNC: 7 G/DL (ref 6–8.5)
RBC # BLD AUTO: 4.63 10*6/MM3 (ref 3.77–5.28)
RBC MORPH BLD: NORMAL
SODIUM SERPL-SCNC: 137 MMOL/L (ref 136–145)
VARIANT LYMPHS NFR BLD MANUAL: 31.3 % (ref 19.6–45.3)
WBC MORPH BLD: NORMAL
WBC NRBC COR # BLD: 6.32 10*3/MM3 (ref 3.4–10.8)

## 2023-06-06 PROCEDURE — 86231 EMA EACH IG CLASS: CPT

## 2023-06-06 PROCEDURE — 86364 TISS TRNSGLTMNASE EA IG CLAS: CPT

## 2023-06-06 PROCEDURE — 86140 C-REACTIVE PROTEIN: CPT

## 2023-06-06 PROCEDURE — 3074F SYST BP LT 130 MM HG: CPT | Performed by: NURSE PRACTITIONER

## 2023-06-06 PROCEDURE — 82784 ASSAY IGA/IGD/IGG/IGM EACH: CPT

## 2023-06-06 PROCEDURE — 85025 COMPLETE CBC W/AUTO DIFF WBC: CPT

## 2023-06-06 PROCEDURE — 86258 DGP ANTIBODY EACH IG CLASS: CPT

## 2023-06-06 PROCEDURE — 80053 COMPREHEN METABOLIC PANEL: CPT

## 2023-06-06 PROCEDURE — 1160F RVW MEDS BY RX/DR IN RCRD: CPT | Performed by: NURSE PRACTITIONER

## 2023-06-06 PROCEDURE — 3079F DIAST BP 80-89 MM HG: CPT | Performed by: NURSE PRACTITIONER

## 2023-06-06 PROCEDURE — 86803 HEPATITIS C AB TEST: CPT

## 2023-06-06 PROCEDURE — 83690 ASSAY OF LIPASE: CPT

## 2023-06-06 PROCEDURE — 85007 BL SMEAR W/DIFF WBC COUNT: CPT

## 2023-06-06 PROCEDURE — 99214 OFFICE O/P EST MOD 30 MIN: CPT | Performed by: NURSE PRACTITIONER

## 2023-06-06 PROCEDURE — 36415 COLL VENOUS BLD VENIPUNCTURE: CPT

## 2023-06-06 PROCEDURE — 1159F MED LIST DOCD IN RCRD: CPT | Performed by: NURSE PRACTITIONER

## 2023-06-06 RX ORDER — OMEPRAZOLE 40 MG/1
40 CAPSULE, DELAYED RELEASE ORAL DAILY
Qty: 30 CAPSULE | Refills: 2 | Status: SHIPPED | OUTPATIENT
Start: 2023-06-06

## 2023-06-06 NOTE — PROGRESS NOTES
New Patient Consultation     Patient Name: Nat Candelaria  : 1960   MRN: [id number]     Chief Complaint:    Chief Complaint   Patient presents with    Abdominal Pain       History of Present Illness: Nat Candelaria is a 62 y.o. female who is here today for a Gastroenterology Consultation for RUQ pain.      Nat reports having episodic nausea from time to time for a while.  About a month ago she had vomiting, diarrhea, abdominal pain (RUQ) and bloating.  Went to the ED and ct that showed mild PD ductal dilation.  Her pcp ordered the recommended MRCP which showed a normal PD but intra and extrahepatic ductal dilation likely secondary to postcholecystectomy ductal ectasia.    Still having RUQ pain.  Happening a few days out of the week. She cannot tell if pain is worse with eating.  She does have early satiety with eating.  Currently taking prilosec daily. No unintentional weight loss.        Her Bms are now normal, soft and daily. She denies blood in the stool.      Has been taking nsaids. No tobacco. Rare alcohol use.      Has a rash on bilateral lower extremities- this has occurred intermittently for years.  She thinks she may have vasculitis. Has an appt with derm later this month.       SCANNED - IMAGING (2023) MRCP-intra and extrahepatic ductal dilation likely secondary to postcholecystectomy ductal ectasia.  Unremarkable pancreas    Last colonoscopy in 2016-10 year recall     Abdominal surgical hx of amu (+gallstones), hysterectomy.    Subjective      Review of Systems:   Review of Systems   Constitutional:  Positive for appetite change. Negative for unexpected weight loss.   HENT:  Negative for trouble swallowing.    Gastrointestinal:  Positive for abdominal distention, abdominal pain and nausea. Negative for anal bleeding, blood in stool, constipation, diarrhea, rectal pain, vomiting, GERD and indigestion.     Past Medical History:   Past Medical History:   Diagnosis Date    Acquired  hypothyroidism     Benign hypertension     Clotting disorder     ITP    Colon polyp     Degenerative joint disease involving multiple joints     Diverticulosis     Mixed hyperlipidemia     Obesity     CLASS 1 OBESITY DUE TO EXCDSS CALORIES WITH SERIOUS COMORBIDITY AND BODY MASS INDEX(BMI) OF 31.0 TO 31.9 IN ADULT    Primary insomnia     Primary osteoarthritis involving multiple joints     Recurrent major depressive episodes, mild        Past Surgical History:   Past Surgical History:   Procedure Laterality Date    CHOLECYSTECTOMY      COLONOSCOPY      HYSTERECTOMY      SPINE SURGERY      X3    TUBAL ABDOMINAL LIGATION  1994       Family History:   Family History   Problem Relation Age of Onset    Coronary artery disease Mother     Breast cancer Mother     Lung cancer Mother     Colon polyps Mother     Coronary artery disease Father     Atrial fibrillation Sister        Social History:   Social History     Socioeconomic History    Marital status:     Number of children: 1    Highest education level: 12th grade   Tobacco Use    Smoking status: Former     Packs/day: 1.00     Years: 16.00     Pack years: 16.00     Types: Cigarettes     Quit date: 1992     Years since quittin.4    Smokeless tobacco: Never   Vaping Use    Vaping Use: Never used   Substance and Sexual Activity    Alcohol use: Not Currently     Comment: rarely    Drug use: Defer    Sexual activity: Yes     Partners: Male     Birth control/protection: Hysterectomy       Alcohol/Tobacco History:   Social History     Substance and Sexual Activity   Alcohol Use Not Currently    Comment: rarely     Social History     Tobacco Use   Smoking Status Former    Packs/day: 1.00    Years: 16.00    Pack years: 16.00    Types: Cigarettes    Quit date: 1992    Years since quittin.4   Smokeless Tobacco Never       Medications:     Current Outpatient Medications:     amLODIPine (NORVASC) 10 MG tablet, Take 1 tablet by mouth Daily.,  "Disp: 90 tablet, Rfl: 1    cyclobenzaprine (FLEXERIL) 5 MG tablet, Take 1 tablet by mouth 3 (Three) Times a Day., Disp: , Rfl:     HYDROcodone-acetaminophen (NORCO) 5-325 MG per tablet, Take 1 tablet by mouth Every 6 (Six) Hours As Needed for Moderate Pain., Disp: 15 tablet, Rfl: 0    levothyroxine (SYNTHROID, LEVOTHROID) 125 MCG tablet, Take 1 tablet by mouth Daily., Disp: 90 tablet, Rfl: 1    omeprazole (priLOSEC) 40 MG capsule, Take 1 capsule by mouth Daily., Disp: 30 capsule, Rfl: 2    ondansetron ODT (ZOFRAN-ODT) 4 MG disintegrating tablet, Place 1 tablet on the tongue Every 8 (Eight) Hours As Needed for Nausea or Vomiting., Disp: 20 tablet, Rfl: 2    promethazine (PHENERGAN) 25 MG tablet, Take 1 tablet by mouth Every 6 (Six) Hours As Needed for Nausea or Vomiting., Disp: 30 tablet, Rfl: 0    sertraline (ZOLOFT) 50 MG tablet, , Disp: , Rfl:     traZODone (DESYREL) 50 MG tablet, Take 1-2 tablets by mouth At Night As Needed for Sleep., Disp: 180 tablet, Rfl: 1    triamcinolone (KENALOG) 0.1 % cream, Apply 1 application topically to the appropriate area as directed 2 (Two) Times a Day As Needed for Rash., Disp: 45 g, Rfl: 0    triamterene-hydrochlorothiazide (MAXZIDE-25) 37.5-25 MG per tablet, Take 1 tablet by mouth Daily., Disp: 90 tablet, Rfl: 1    Allergies:   No Known Allergies    Objective     Physical Exam:  Vital Signs:   Vitals:    06/06/23 1313   BP: 118/82   BP Location: Right arm   Patient Position: Sitting   Cuff Size: Adult   Pulse: 71   Temp: 97.3 °F (36.3 °C)   TempSrc: Temporal   SpO2: 100%   Weight: 78.2 kg (172 lb 6.4 oz)   Height: 160 cm (63\")     Body mass index is 30.54 kg/m².     Physical Exam  Vitals and nursing note reviewed.   Constitutional:       General: She is not in acute distress.     Appearance: She is well-developed. She is not diaphoretic.   Eyes:      General: No scleral icterus.     Conjunctiva/sclera: Conjunctivae normal.   Neck:      Thyroid: No thyromegaly.   Cardiovascular: "      Rate and Rhythm: Normal rate and regular rhythm.   Pulmonary:      Effort: Pulmonary effort is normal.      Breath sounds: Normal breath sounds.   Abdominal:      General: Bowel sounds are normal. There is no distension.      Palpations: Abdomen is soft.      Tenderness: There is no abdominal tenderness in the right upper quadrant. There is no guarding or rebound.      Hernia: No hernia is present.      Comments: Mild ruq tenderness. Negative Carnett's sign    Musculoskeletal:      Cervical back: Neck supple.      Right lower leg: No edema.      Left lower leg: No edema.   Skin:     General: Skin is warm and dry.      Capillary Refill: Capillary refill takes 2 to 3 seconds.      Coloration: Skin is not jaundiced or pale.      Findings: No bruising or petechiae.      Nails: There is no clubbing.   Neurological:      Mental Status: She is alert and oriented to person, place, and time.   Psychiatric:         Behavior: Behavior normal.         Thought Content: Thought content normal.         Judgment: Judgment normal.       Assessment / Plan      Assessment/Plan:   Diagnoses and all orders for this visit:    1. RUQ pain (Primary)  -     CBC & Differential; Future  -     Comprehensive Metabolic Panel; Future  -     C-reactive Protein; Future  -     Lipase; Future  -     Celiac Comprehensive Panel; Future    2. Early satiety    3. Elevated liver enzymes  -     Hepatitis C Antibody; Future    Continue PPI.  Discussed taking this 30 to 60 minutes before meal.  I would avoid NSAIDs at this interval.  Recommend EGD.    Follow Up:   Return in about 8 weeks (around 8/1/2023), or if symptoms worsen or fail to improve.    Plan of care reviewed with the patient at the conclusion of today's visit.  Education was provided regarding diagnosis, management, and any prescribed or recommended OTC medications.  Patient verbalized understanding of and agreement with management plan.     Time Statement:   Discussed plan of care in  detail with patient today. Patient verbally understands and agrees. I have spent 30 minutes reviewing available diagnostics, obtaining history, examining the patient, developing a treatment plan, and educating the patient on disease process and plan of care.    SABRINA Hinton  [facility]

## 2023-06-07 LAB
ENDOMYSIUM IGA SER QL: NEGATIVE
GLIADIN PEPTIDE IGA SER-ACNC: 8 UNITS (ref 0–19)
GLIADIN PEPTIDE IGG SER-ACNC: 67 UNITS (ref 0–19)
IGA SERPL-MCNC: 96 MG/DL (ref 87–352)
TTG IGA SER-ACNC: <2 U/ML (ref 0–3)
TTG IGG SER-ACNC: <2 U/ML (ref 0–5)

## 2023-06-08 ENCOUNTER — TELEPHONE (OUTPATIENT)
Dept: GASTROENTEROLOGY | Facility: CLINIC | Age: 63
End: 2023-06-08
Payer: MEDICARE

## 2023-06-08 DIAGNOSIS — D69.6 THROMBOCYTOPENIA: Primary | ICD-10-CM

## 2023-06-08 NOTE — TELEPHONE ENCOUNTER
Spoke with the patient regarding her thrombocytopenia.  She reports she has had this intermittently for 20 or more years and was told by her family doctor when she was younger that she had ITP.  She has never seen a hematologist.  She is agreeable to seeing one now- referral placed.    We also discussed positive celiac antibody and I will.  She will need an EGD with duodenal biopsy.  Continue to eat gluten for now

## 2023-06-12 ENCOUNTER — OUTSIDE FACILITY SERVICE (OUTPATIENT)
Dept: GASTROENTEROLOGY | Facility: CLINIC | Age: 63
End: 2023-06-12
Payer: MEDICARE

## 2023-06-12 PROCEDURE — 43239 EGD BIOPSY SINGLE/MULTIPLE: CPT | Performed by: INTERNAL MEDICINE

## 2023-07-18 PROBLEM — D69.6 THROMBOCYTOPENIA: Status: ACTIVE | Noted: 2023-07-18

## 2023-07-21 ENCOUNTER — TELEPHONE (OUTPATIENT)
Dept: FAMILY MEDICINE CLINIC | Facility: CLINIC | Age: 63
End: 2023-07-21
Payer: MEDICARE

## 2023-07-21 DIAGNOSIS — R21 RASH: Primary | ICD-10-CM

## 2023-07-21 NOTE — TELEPHONE ENCOUNTER
Caller: Nat Candelaria    Relationship: Self    Best call back number: [phone]     What is the medical concern/diagnosis:   RASH ON LEGS     What specialty or service is being requested:   RHEUMATOLOGIST     Any additional details:   PATIENT WAS INFORMED BY HER HEMATOLOGIST THAT SHE WOULD NEED TO BE SEEN BY A RHEUMATOLOGIST FOR THE ONGOING RASH ON HER LEGS AND WOULD LIKE FOR A REFERRAL TO BE PLACED

## 2023-08-04 RX ORDER — AMLODIPINE BESYLATE 10 MG/1
TABLET ORAL
Qty: 90 TABLET | Refills: 1 | Status: SHIPPED | OUTPATIENT
Start: 2023-08-04

## 2023-08-21 RX ORDER — TRIAMTERENE AND HYDROCHLOROTHIAZIDE 37.5; 25 MG/1; MG/1
TABLET ORAL
Qty: 90 TABLET | Refills: 1 | Status: SHIPPED | OUTPATIENT
Start: 2023-08-21

## 2023-08-21 RX ORDER — LEVOTHYROXINE SODIUM 0.12 MG/1
TABLET ORAL
Qty: 90 TABLET | Refills: 1 | Status: SHIPPED | OUTPATIENT
Start: 2023-08-21

## 2023-08-30 ENCOUNTER — OFFICE VISIT (OUTPATIENT)
Dept: FAMILY MEDICINE CLINIC | Facility: CLINIC | Age: 63
End: 2023-08-30
Payer: MEDICARE

## 2023-08-30 VITALS
WEIGHT: 172.19 LBS | BODY MASS INDEX: 30.51 KG/M2 | TEMPERATURE: 98.1 F | SYSTOLIC BLOOD PRESSURE: 118 MMHG | OXYGEN SATURATION: 99 % | HEART RATE: 72 BPM | DIASTOLIC BLOOD PRESSURE: 76 MMHG | HEIGHT: 63 IN

## 2023-08-30 DIAGNOSIS — J01.00 ACUTE NON-RECURRENT MAXILLARY SINUSITIS: ICD-10-CM

## 2023-08-30 DIAGNOSIS — M25.50 ARTHRALGIA, UNSPECIFIED JOINT: ICD-10-CM

## 2023-08-30 DIAGNOSIS — H93.8X3 EAR FULLNESS, BILATERAL: Primary | ICD-10-CM

## 2023-08-30 RX ORDER — DOXYCYCLINE HYCLATE 100 MG/1
100 CAPSULE ORAL 2 TIMES DAILY
Qty: 20 CAPSULE | Refills: 0 | Status: SHIPPED | OUTPATIENT
Start: 2023-08-30

## 2023-08-30 RX ORDER — AMOXICILLIN AND CLAVULANATE POTASSIUM 875; 125 MG/1; MG/1
TABLET, FILM COATED ORAL
COMMUNITY
Start: 2023-08-18 | End: 2023-08-30

## 2023-08-30 RX ORDER — PREDNISONE 20 MG/1
TABLET ORAL
Qty: 12 TABLET | Refills: 0 | Status: SHIPPED | OUTPATIENT
Start: 2023-08-30

## 2023-08-30 RX ORDER — PREDNISONE 10 MG/1
TABLET ORAL
COMMUNITY
Start: 2023-08-18 | End: 2023-08-30

## 2023-08-30 RX ORDER — SERTRALINE HYDROCHLORIDE 100 MG/1
100 TABLET, FILM COATED ORAL DAILY
COMMUNITY
Start: 2023-08-18

## 2023-08-30 NOTE — ASSESSMENT & PLAN NOTE
Antibiotic as prescribed.  We will treat with another round of prednisone.  Avoid NSAIDs while on prednisone.  Continue with allergy meds.  Fluids and rest encouraged.  Risk of meds discussed understood.  Education provided.  Return in 2 days if no improvement, sooner if worsens.  Follow-up with ENT and we will place that referral.  Return to clinic or ED with any issues or concerns.

## 2023-08-30 NOTE — ASSESSMENT & PLAN NOTE
Prednisone as prescribed.  Will place ENT referral.  Education provided.  Continue with allergy meds.  Return in 2 days if no improvement, sooner if worsens.  Follow-up with ENT.  Return to clinic or ED with any issues or concerns.

## 2023-08-30 NOTE — PROGRESS NOTES
"Chief Complaint  Earache    Subjective          Nat Candelaria presents to [facility]  Earache   There is pain in both ears. This is a chronic problem. The current episode started more than 1 year ago. The problem occurs constantly. The problem has been waxing and waning. There has been no fever. The pain is at a severity of 8/10. Associated symptoms include headaches and hearing loss. Pertinent negatives include no abdominal pain, coughing, diarrhea, ear discharge, rash, rhinorrhea, sore throat or vomiting.     Patient states for over 2 weeks she has had sinus pressure and congestion and bilateral ear fullness.  States she went to urgent care 1 to 2 weeks ago and they treated her with Augmentin and a steroid Dosepak but states that did not help much.  States she continues to have the fullness and muffled sensation in her ears.  States her upper teeth are sore from the pressure in her sinuses.  No fever no chills no body aches.  She is wondering if she should get back in with ENT to discuss possible ear tubes.  No discharge.    She also states that she would like a referral to a rheumatologist.  States she has recently seen GI and hematology and they informed her that due to her joint aches and pains that they recommend she see 1 so she would like for me to put that referral in.  No redness no warmth no swelling.  States particularly affects her ankles and hands.    Objective   Vital Signs:   /76   Pulse 72   Temp 98.1 øF (36.7 øC)   Ht 160 cm (63\")   Wt 78.1 kg (172 lb 3 oz)   SpO2 99%   BMI 30.50 kg/mý     Body mass index is 30.5 kg/mý.    Review of Systems   Constitutional:  Negative for chills, fatigue and fever.   HENT:  Positive for ear pain, hearing loss and sinus pressure. Negative for ear discharge, rhinorrhea, sore throat, swollen glands and trouble swallowing.    Eyes:  Negative for visual disturbance.   Respiratory:  Negative for cough, shortness of breath and " wheezing.    Cardiovascular: Negative.    Gastrointestinal:  Negative for abdominal pain, constipation, diarrhea, nausea and vomiting.   Genitourinary:  Negative for decreased urine volume, dysuria, frequency, hematuria and urgency.   Musculoskeletal:  Positive for arthralgias.   Skin:  Negative for rash.   Neurological:  Negative for dizziness, weakness, numbness and headache.     Past History:  Medical History: has a past medical history of Acquired hypothyroidism, Benign hypertension, Clotting disorder (1999), Colon polyp (2000), Degenerative joint disease involving multiple joints, Diverticulosis, Mixed hyperlipidemia, Obesity, Primary insomnia, Primary osteoarthritis involving multiple joints, and Recurrent major depressive episodes, mild.   Surgical History: has a past surgical history that includes Hysterectomy; Spine surgery; Tubal ligation (1994); Cholecystectomy (2001); and Colonoscopy (2000).   Family History: family history includes Atrial fibrillation in her sister; Breast cancer in her mother; Colon polyps in her mother; Coronary artery disease in her father and mother; Lung cancer in her mother.   Social History: reports that she quit smoking about 31 years ago. Her smoking use included cigarettes. She has a 16.00 pack-year smoking history. She has never used smokeless tobacco. She reports that she does not currently use alcohol. Drug use questions deferred to the physician.    PHQ-2 Depression Screening  Little interest or pleasure in doing things?     Feeling down, depressed, or hopeless?     PHQ-2 Total Score          PHQ-9 Depression Screening  Little interest or pleasure in doing things?     Feeling down, depressed, or hopeless?     Trouble falling or staying asleep, or sleeping too much?     Feeling tired or having little energy?     Poor appetite or overeating?     Feeling bad about yourself - or that you are a failure or have let yourself or your family down?     Trouble concentrating on  things, such as reading the newspaper or watching television?     Moving or speaking so slowly that other people could have noticed? Or the opposite - being so fidgety or restless that you have been moving around a lot more than usual?     Thoughts that you would be better off dead, or of hurting yourself in some way?     PHQ-9 Total Score     If you checked off any problems, how difficult have these problems made it for you to do your work, take care of things at home, or get along with other people?       PHQ-9 Total Score:        Patient screened positive for depression based on a PHQ-9 score of 0 on 4/27/2023. Follow-up recommendations include:          Current Outpatient Medications:     cyclobenzaprine (FLEXERIL) 5 MG tablet, Take 1 tablet by mouth 3 (Three) Times a Day., Disp: , Rfl:     HYDROcodone-acetaminophen (NORCO) 5-325 MG per tablet, Take 1 tablet by mouth Every 6 (Six) Hours As Needed for Moderate Pain., Disp: 15 tablet, Rfl: 0    levothyroxine (SYNTHROID, LEVOTHROID) 125 MCG tablet, TAKE ONE TABLET BY MOUTH DAILY, Disp: 90 tablet, Rfl: 1    omeprazole (priLOSEC) 40 MG capsule, Take 1 capsule by mouth Daily., Disp: 30 capsule, Rfl: 2    sertraline (ZOLOFT) 100 MG tablet, Take 1 tablet by mouth Daily., Disp: , Rfl:     traZODone (DESYREL) 50 MG tablet, Take 1-2 tablets by mouth At Night As Needed for Sleep., Disp: 180 tablet, Rfl: 1    triamcinolone (KENALOG) 0.1 % cream, Apply 1 application topically to the appropriate area as directed 2 (Two) Times a Day As Needed for Rash., Disp: 45 g, Rfl: 0    triamterene-hydrochlorothiazide (MAXZIDE-25) 37.5-25 MG per tablet, TAKE ONE TABLET BY MOUTH DAILY, Disp: 90 tablet, Rfl: 1    amLODIPine (NORVASC) 10 MG tablet, TAKE ONE TABLET BY MOUTH DAILY, Disp: 90 tablet, Rfl: 1    doxycycline (VIBRAMYCIN) 100 MG capsule, Take 1 capsule by mouth 2 (Two) Times a Day., Disp: 20 capsule, Rfl: 0    predniSONE (DELTASONE) 20 MG tablet, Take 2 tabs PO qd x 4 days then 1 tab  PO qd x 3 days then 0.5 tab PO qd x 2 days, Disp: 12 tablet, Rfl: 0   (Not in a hospital admission)     Allergies: Patient has no known allergies.    Physical Exam  Constitutional:       Appearance: Normal appearance.   HENT:      Right Ear: Ear canal and external ear normal.      Left Ear: Ear canal and external ear normal.      Ears:      Comments: Bilateral bulging tympanic membranes.     Nose: Congestion present.      Comments: Maxillary sinuses tender bilaterally.     Mouth/Throat:      Mouth: Mucous membranes are moist.      Pharynx: Oropharynx is clear.   Cardiovascular:      Rate and Rhythm: Normal rate and regular rhythm.      Heart sounds: Normal heart sounds.   Pulmonary:      Effort: Pulmonary effort is normal.      Breath sounds: Normal breath sounds.   Musculoskeletal:         General: Normal range of motion.   Skin:     General: Skin is warm.      Findings: No erythema.   Neurological:      General: No focal deficit present.      Mental Status: She is alert and oriented to person, place, and time. Mental status is at baseline.   Psychiatric:         Mood and Affect: Mood normal.         Behavior: Behavior normal.         Thought Content: Thought content normal.         Judgment: Judgment normal.        Result Review :                   Assessment and Plan    Diagnoses and all orders for this visit:    1. Ear fullness, bilateral (Primary)  Assessment & Plan:  Prednisone as prescribed.  Will place ENT referral.  Education provided.  Continue with allergy meds.  Return in 2 days if no improvement, sooner if worsens.  Follow-up with ENT.  Return to clinic or ED with any issues or concerns.    Orders:  -     predniSONE (DELTASONE) 20 MG tablet; Take 2 tabs PO qd x 4 days then 1 tab PO qd x 3 days then 0.5 tab PO qd x 2 days  Dispense: 12 tablet; Refill: 0  -     Ambulatory Referral to ENT (Otolaryngology)    2. Acute non-recurrent maxillary sinusitis  Assessment & Plan:  Antibiotic as prescribed.  We will  treat with another round of prednisone.  Avoid NSAIDs while on prednisone.  Continue with allergy meds.  Fluids and rest encouraged.  Risk of meds discussed understood.  Education provided.  Return in 2 days if no improvement, sooner if worsens.  Follow-up with ENT and we will place that referral.  Return to clinic or ED with any issues or concerns.    Orders:  -     predniSONE (DELTASONE) 20 MG tablet; Take 2 tabs PO qd x 4 days then 1 tab PO qd x 3 days then 0.5 tab PO qd x 2 days  Dispense: 12 tablet; Refill: 0  -     doxycycline (VIBRAMYCIN) 100 MG capsule; Take 1 capsule by mouth 2 (Two) Times a Day.  Dispense: 20 capsule; Refill: 0    3. Arthralgia, unspecified joint  Assessment & Plan:  Informed her that the prednisone should also help with this.  Will place referral to rheumatology for further evaluation.  Education provided.  Proper diet and exercise plan discussed and encouraged.  Return to clinic or ED with any issues or concerns.    Orders:  -     Ambulatory Referral to Rheumatology                      Follow Up   Return in about 3 months (around 11/30/2023), or if symptoms worsen or fail to improve.  Patient was given instructions and counseling regarding her condition or for health maintenance advice. Please see specific information pulled into the AVS if appropriate.     SABRINA Salmon

## 2023-08-30 NOTE — ASSESSMENT & PLAN NOTE
Informed her that the prednisone should also help with this.  Will place referral to rheumatology for further evaluation.  Education provided.  Proper diet and exercise plan discussed and encouraged.  Return to clinic or ED with any issues or concerns.

## 2023-09-06 RX ORDER — TRAZODONE HYDROCHLORIDE 50 MG/1
TABLET ORAL
Qty: 180 TABLET | Refills: 1 | Status: SHIPPED | OUTPATIENT
Start: 2023-09-06

## 2023-09-15 ENCOUNTER — TELEPHONE (OUTPATIENT)
Dept: FAMILY MEDICINE CLINIC | Facility: CLINIC | Age: 63
End: 2023-09-15

## 2023-09-15 NOTE — TELEPHONE ENCOUNTER
Caller: Nat Candelaria    Relationship to patient: Self    Best call back number: [phone]     Chief complaint: SYMPTOMS  STARTED TUESDAY 9-12-23  OOZING, ITCHY BLISTERS ON BOTH ARMS, ONE LEG, BEHIND EAR, ON FORWARD, UNDER BREAST     Type of visit:  SAME DAY     Requested date:     If rescheduling, when is the original appointment:    Additional notes  PLEASE CALL

## 2023-09-16 ENCOUNTER — OFFICE VISIT (OUTPATIENT)
Dept: FAMILY MEDICINE CLINIC | Facility: CLINIC | Age: 63
End: 2023-09-16
Payer: MEDICARE

## 2023-09-16 VITALS
TEMPERATURE: 97.8 F | HEART RATE: 77 BPM | OXYGEN SATURATION: 98 % | HEIGHT: 63 IN | WEIGHT: 174.5 LBS | DIASTOLIC BLOOD PRESSURE: 70 MMHG | BODY MASS INDEX: 30.92 KG/M2 | SYSTOLIC BLOOD PRESSURE: 118 MMHG

## 2023-09-16 DIAGNOSIS — L23.7 POISON IVY: Primary | ICD-10-CM

## 2023-09-16 RX ORDER — METHYLPREDNISOLONE SODIUM SUCCINATE 40 MG/ML
40 INJECTION, POWDER, LYOPHILIZED, FOR SOLUTION INTRAMUSCULAR; INTRAVENOUS ONCE
Status: COMPLETED | OUTPATIENT
Start: 2023-09-16 | End: 2023-09-16

## 2023-09-16 RX ADMIN — METHYLPREDNISOLONE SODIUM SUCCINATE 40 MG: 40 INJECTION, POWDER, LYOPHILIZED, FOR SOLUTION INTRAMUSCULAR; INTRAVENOUS at 10:58

## 2023-09-16 NOTE — PROGRESS NOTES
Office Note     Name: Nat Candelaria    : 1960     MRN: [id number]     Chief Complaint  Rash    Subjective     History of Present Illness:  Nat Candelaria is a 62 y.o. female who presents today for hospital poison ivy.      3 days ago notices rash over her face andforehead  got to the ears and near the eyes .  Is under both breasts    This persisted and worsened despite 2 recent rounds of prednisone orally.    Review of Systems:   Review of Systems    Past Medical History:   Past Medical History:   Diagnosis Date    Acquired hypothyroidism     Benign hypertension     Clotting disorder     ITP    Colon polyp     Degenerative joint disease involving multiple joints     Diverticulosis     Mixed hyperlipidemia     Obesity     CLASS 1 OBESITY DUE TO EXCDSS CALORIES WITH SERIOUS COMORBIDITY AND BODY MASS INDEX(BMI) OF 31.0 TO 31.9 IN ADULT    Primary insomnia     Primary osteoarthritis involving multiple joints     Recurrent major depressive episodes, mild        Past Surgical History:   Past Surgical History:   Procedure Laterality Date    CHOLECYSTECTOMY      COLONOSCOPY      HYSTERECTOMY      SPINE SURGERY      X3    TUBAL ABDOMINAL LIGATION  1994       Family History:   Family History   Problem Relation Age of Onset    Coronary artery disease Mother     Breast cancer Mother     Lung cancer Mother     Colon polyps Mother     Coronary artery disease Father     Atrial fibrillation Sister        Social History:   Social History     Socioeconomic History    Marital status:     Number of children: 1    Highest education level: 12th grade   Tobacco Use    Smoking status: Former     Packs/day: 1.00     Years: 16.00     Pack years: 16.00     Types: Cigarettes     Quit date: 1992     Years since quittin.7    Smokeless tobacco: Never   Vaping Use    Vaping Use: Never used   Substance and Sexual Activity    Alcohol use: Not Currently     Comment: rarely    Drug use: Defer    Sexual  "activity: Yes     Partners: Male     Birth control/protection: Hysterectomy       Immunizations:   Immunization History   Administered Date(s) Administered    COVID-19 (MODERNA) 1st,2nd,3rd Dose Monovalent 01/19/2022    Tdap 03/26/2018        Medications:     Current Outpatient Medications:     amLODIPine (NORVASC) 10 MG tablet, TAKE ONE TABLET BY MOUTH DAILY, Disp: 90 tablet, Rfl: 1    cyclobenzaprine (FLEXERIL) 5 MG tablet, Take 1 tablet by mouth 3 (Three) Times a Day., Disp: , Rfl:     HYDROcodone-acetaminophen (NORCO) 5-325 MG per tablet, Take 1 tablet by mouth Every 6 (Six) Hours As Needed for Moderate Pain., Disp: 15 tablet, Rfl: 0    levothyroxine (SYNTHROID, LEVOTHROID) 125 MCG tablet, TAKE ONE TABLET BY MOUTH DAILY, Disp: 90 tablet, Rfl: 1    omeprazole (priLOSEC) 40 MG capsule, Take 1 capsule by mouth Daily., Disp: 30 capsule, Rfl: 2    sertraline (ZOLOFT) 100 MG tablet, Take 1 tablet by mouth Daily., Disp: , Rfl:     traZODone (DESYREL) 50 MG tablet, TAKE 1 TO 2 TABLETS BY MOUTH AT NIGHT AS NEEDED FOR SLEEP, Disp: 180 tablet, Rfl: 1    triamcinolone (KENALOG) 0.1 % cream, Apply 1 application topically to the appropriate area as directed 2 (Two) Times a Day As Needed for Rash., Disp: 45 g, Rfl: 0    triamterene-hydrochlorothiazide (MAXZIDE-25) 37.5-25 MG per tablet, TAKE ONE TABLET BY MOUTH DAILY, Disp: 90 tablet, Rfl: 1    doxycycline (VIBRAMYCIN) 100 MG capsule, Take 1 capsule by mouth 2 (Two) Times a Day. (Patient not taking: Reported on 9/16/2023), Disp: 20 capsule, Rfl: 0    predniSONE (DELTASONE) 20 MG tablet, Take 2 tabs PO qd x 4 days then 1 tab PO qd x 3 days then 0.5 tab PO qd x 2 days (Patient not taking: Reported on 9/16/2023), Disp: 12 tablet, Rfl: 0    Allergies:   No Known Allergies    Objective     Vital Signs  /70   Pulse 77   Temp 97.8 °F (36.6 °C)   Ht 160 cm (63\")   Wt 79.2 kg (174 lb 8 oz)   SpO2 98%   BMI 30.91 kg/m²   Estimated body mass index is 30.91 kg/m² as calculated " "from the following:    Height as of this encounter: 160 cm (63\").    Weight as of this encounter: 79.2 kg (174 lb 8 oz).            Physical Exam  Vitals and nursing note reviewed.   Constitutional:       Appearance: Normal appearance.   Cardiovascular:      Rate and Rhythm: Normal rate and regular rhythm.      Heart sounds: No murmur heard.    No friction rub. No gallop.   Pulmonary:      Effort: Pulmonary effort is normal.      Breath sounds: Normal breath sounds. No wheezing, rhonchi or rales.   Skin:     Comments: Erythematous raised papular lesions ranging in size from 2 mm to 6 mm scattered over all 4 extremities, trunk, abdomen, face   Neurological:      Mental Status: She is alert.          Procedures     Assessment and Plan     1. Poison ivy    - methylPREDNISolone sodium succinate (SOLU-Medrol) injection 40 mg       Follow Up  Return if symptoms worsen or fail to improve.    Patient was advised to call the office or seek medical care if  any issues discussed during this visit worsen or persist or if new concerns arise        MD EMMA López [facility]  [address] [place] [id number] [phone]  "

## 2023-09-19 ENCOUNTER — TELEPHONE (OUTPATIENT)
Dept: FAMILY MEDICINE CLINIC | Facility: CLINIC | Age: 63
End: 2023-09-19

## 2023-09-19 NOTE — TELEPHONE ENCOUNTER
Caller: Nat Candelaria    Relationship: Self    Best call back number: [phone]     What medication are you requesting: ORAL MEDICATION FOR POISON IVY     What are your current symptoms: POISON IVY SORES     How long have you been experiencing symptoms: 9.12    Have you had these symptoms before:    [x] Yes  [] No    Have you been treated for these symptoms before:   [x] Yes  [] No    If a prescription is needed, what is your preferred pharmacy and phone number: [facility] [id number] [place] [address] - [phone]  - [phone] FX     Additional notes:  SAW DR BAILEY ON SATURDAY AND WAS GIVEN A SHOT FOR THIS AND TOLD TO CALL BACK IF NOT BETTER TO GET ORAL MEDICATION. NOT ANY BETTER

## 2023-10-19 ENCOUNTER — OFFICE VISIT (OUTPATIENT)
Dept: ENDOCRINOLOGY | Facility: CLINIC | Age: 63
End: 2023-10-19
Payer: MEDICARE

## 2023-10-19 VITALS
SYSTOLIC BLOOD PRESSURE: 150 MMHG | OXYGEN SATURATION: 96 % | WEIGHT: 178 LBS | HEART RATE: 70 BPM | RESPIRATION RATE: 20 BRPM | TEMPERATURE: 97.8 F | BODY MASS INDEX: 31.53 KG/M2 | DIASTOLIC BLOOD PRESSURE: 80 MMHG

## 2023-10-19 DIAGNOSIS — E03.9 ACQUIRED HYPOTHYROIDISM: Primary | ICD-10-CM

## 2023-10-19 NOTE — PROGRESS NOTES
Chief complaint/Reason for consult: Hypothyroidism    Consult requested by Aaron Eli DO    HPI: Patient is a 63year old female here for consultation of hypothyroidism.     Labs reviewed  9/18/2023  25-OH Vitamin D 25.7  Thyroglobulin antibody 116.2  TPO antibody 12  TSH 0.661  Free T4 1.67    # Hypothyroidism  - Diagnosed >20 years ago    - Patient with family history of hypothyroidism  - Is currently on levothyroxine 125 mcg daily  - Reports good compliance with levothyroxine and takes it at the same time every day but with her BP medications (no food) and not with hot beverages (drinks coffee 10-15 minutes after taking it)  - Denies taking biotin supplement  - She has cold intolerance, occasional fatigue and thinning hair     Past medical history, past surgical history, family history and social history reviewed within this encounter.     Review of Systems   Constitutional:  Positive for fatigue. Negative for activity change and unexpected weight change.   HENT:  Negative for trouble swallowing and voice change.    Respiratory:  Negative for shortness of breath.    Cardiovascular:  Positive for leg swelling.   Gastrointestinal:  Negative for abdominal pain.   Endocrine: Positive for cold intolerance. Negative for heat intolerance.   Musculoskeletal:  Positive for neck stiffness. Negative for gait problem.   Skin:  Negative for rash.   Neurological:  Negative for numbness.   Psychiatric/Behavioral:  Negative for agitation and confusion.         /80 (BP Location: Right arm, Patient Position: Sitting)   Pulse 70   Temp 97.8 °F (36.6 °C) (Temporal)   Resp 20   Wt 80.7 kg (178 lb)   SpO2 96%   BMI 31.53 kg/m²      Physical Exam  Vitals reviewed.   Constitutional:       General: She is not in acute distress.  HENT:      Head: Normocephalic.      Nose: Nose normal.   Eyes:      Conjunctiva/sclera: Conjunctivae normal.   Neck:      Comments: Right anterior scar from prior neck surgery, no significant  thyromegaly or thyroid nodules  Cardiovascular:      Rate and Rhythm: Normal rate.      Pulses: Normal pulses.   Pulmonary:      Effort: Pulmonary effort is normal.   Abdominal:      Tenderness: There is no guarding.   Musculoskeletal:      Cervical back: Neck supple.      Comments: Trace bilateral lower extremity edema   Skin:     General: Skin is warm and dry.   Neurological:      Mental Status: She is alert and oriented to person, place, and time.   Psychiatric:         Mood and Affect: Mood normal.         Behavior: Behavior normal.         Thought Content: Thought content normal.        Labs and images reviewed as noted in the HPI    Assessment and plan:    Diagnoses and all orders for this visit:    1. Acquired hypothyroidism (Primary)  Assessment & Plan:  -Patient with elevated thyroglobulin antibodies and negative TPO antibody  -Recommend treatment of underlying hypothyroidism; if patient continues to have variable TSH will consider switching levothyroxine to Tirosint to see if better absorption  -Counseled to take medication on an empty stomach, making sure to get 7 doses in per week    Orders:  -     TSH; Future  -     T4, Free; Future       Return in about 1 year (around 10/19/2024) for Hypothyroidism.     Electronically signed by: Kyle S Rosenstein, MD

## 2023-10-19 NOTE — ASSESSMENT & PLAN NOTE
-Patient with elevated thyroglobulin antibodies and negative TPO antibody  -Recommend treatment of underlying hypothyroidism; if patient continues to have variable TSH will consider switching levothyroxine to Tirosint to see if better absorption  -Counseled to take medication on an empty stomach, making sure to get 7 doses in per week

## 2023-10-23 DIAGNOSIS — R10.11 RUQ PAIN: ICD-10-CM

## 2023-10-23 DIAGNOSIS — R68.81 EARLY SATIETY: ICD-10-CM

## 2023-10-23 RX ORDER — OMEPRAZOLE 40 MG/1
40 CAPSULE, DELAYED RELEASE ORAL DAILY
Qty: 90 CAPSULE | Refills: 2 | Status: SHIPPED | OUTPATIENT
Start: 2023-10-23

## 2023-11-14 ENCOUNTER — LAB (OUTPATIENT)
Dept: FAMILY MEDICINE CLINIC | Facility: CLINIC | Age: 63
End: 2023-11-14
Payer: MEDICARE

## 2023-11-14 DIAGNOSIS — E03.9 ACQUIRED HYPOTHYROIDISM: ICD-10-CM

## 2023-11-15 LAB
T4 FREE SERPL-MCNC: 1.72 NG/DL (ref 0.82–1.77)
TSH SERPL DL<=0.005 MIU/L-ACNC: 0.43 UIU/ML (ref 0.45–4.5)

## 2023-11-16 RX ORDER — SERTRALINE HYDROCHLORIDE 100 MG/1
100 TABLET, FILM COATED ORAL DAILY
Qty: 90 TABLET | Refills: 1 | Status: SHIPPED | OUTPATIENT
Start: 2023-11-16

## 2023-11-16 NOTE — TELEPHONE ENCOUNTER
Rx Refill Note  Requested Prescriptions     Pending Prescriptions Disp Refills    sertraline (ZOLOFT) 100 MG tablet [Pharmacy Med Name: SERTRALINE  MG TABLET] 90 tablet      Sig: Take 1 tablet by mouth Daily.      Last office visit with prescribing clinician: 5/8/2023   Last telemedicine visit with prescribing clinician: Visit date not found   Next office visit with prescribing clinician: Visit date not found                         Would you like a call back once the refill request has been completed: [] Yes [] No    If the office needs to give you a call back, can they leave a voicemail: [] Yes [] No    Kennedi Trimble MA  11/16/23, 09:26 EST   
SL

## 2023-12-11 ENCOUNTER — E-VISIT (OUTPATIENT)
Dept: FAMILY MEDICINE CLINIC | Facility: TELEHEALTH | Age: 63
End: 2023-12-11
Payer: MEDICARE

## 2023-12-11 PROCEDURE — BRIGHTMDVISIT: Performed by: NURSE PRACTITIONER

## 2023-12-11 NOTE — E-VISIT TREATED
Chief Complaint: Bladder infection (UTI)   Patient introduction   Patient is 63-year-old female.   Patient has had dysuria and frequent urination for 1 to 3 days.   Urine is cloudy with no unusual odor.   General presentation   Patient has not had a fever. Patient has nausea.   Moderate abdominal or pelvic pain.   No back pain.   No flank pain. Difficulty starting, stopping, or delaying urination.   The following treatments were not helpful for current symptoms:    Acetaminophen    Ibuprofen    Phenazopyridine   Previous history of UTI. Current symptoms feel exactly the same as previous UTIs. Received treatment for UTI 0 times in last year.   No known history of yeast infections as a result of taking antibiotics for past UTIs.   No history of pyelonephritis. History of kidney stones, but not within the last year.   No sexual intercourse in the past week. Does not use diaphragm. No unprotected sexual intercourse with a new partner in the last 2 weeks.   Patient is not being treated for diabetes mellitus.   Review of red flags/alarm symptoms:    No recent hospitalizations or nursing home care (last 3 months)    No history of renal failure    No recent history of urologic instrumentation    No anatomic abnormalities of the urinary tract    No abnormal vaginal discharge    No visible vaginal sores    No pain with sexual intercourse    No abnormal vaginal bleeding or spotting   Pregnancy/menstrual status/breastfeeding:   Patient is postmenopausal.   Current medications   Currently taking omeprazole 40 MG capsule, triamterene-hydrochlorothiazide 37.5-25 MG per tablet, levothyroxine 125 MCG tablet, amLODIPine 10 MG tablet, traZODone 50 MG tablet, and sertraline 100 MG tablet.   Medication allergies   None.   Medication contraindication review   Not taking ACE inhibitors and ARBs.   Patient is being treated for high blood pressure. Therefore, the following medication(s) will not be prescribed:    Ciprofloxacin   No known  history of amoxicillin-clavulanate-associated cholestatic jaundice or nitrofurantoin-associated cholestatic jaundice.   Past medical history   Immune conditions: No immunocompromising conditions.   No history of cancer.   Patient-submitted comments   Patient was asked if they had anything to add about their symptoms. Patient writes: My  has cancer, he and I both have COVID, I need some relief from this UTI, Please.   Patient did not request an excuse note.   Assessment   Uncomplicated acute UTI.   This is the likely diagnosis based on patient's interview responses, including:    Symptom profile    Previous history of UTI    Current symptoms are exactly the same as previous UTIs    Recent history of delaying urination   No recent history of kidney stones.   Plan   Medications:    nitrofurantoin monohydrate/macrocrystals 100 mg capsule RX 100mg 1 cap PO q12h 5d for infection. This medication is an antibiotic. Take it exactly as directed. You must finish the entire course of medication, even if you feel better after taking the first few doses. Amount is 10 cap.    phenazopyridine 200 mg tablet RX 200mg 1 tab PO tid PRN 2d for pain or discomfort associated with your condition. Amount is 6 tab.   The patient's prescriptions will be sent to:   [facility] [id number]   [address] [place] [address] [address] [place] [address]   Phone: [phone]     Fax: [phone]   Education:    Condition and causes    Prevention    Treatment and self-care    When to call provider   Follow-up:   Patient to follow up as needed for progression or lack of improvement in symptoms within 3d.   ----------   Electronically signed by SABRINA Sheridan on 2023-12-11 at 13:45PM   ----------   Patient Interview Transcript:   Knowing about your anatomy is important for diagnosing and treating UTIs. The gender we have on file for you is female, but we realize that this might not tell the whole story. Would you like to tell us more  about your anatomy?    No   Not selected:    Yes   Which of these symptoms do you have? Select all that apply.    Pain or burning while urinating    Frequent urination   Not selected:    _Sudden urge to urinate and it's hard to hold the urine in _   How long have you had these symptoms? Select one.    1 to 3 days   Not selected:    Less than 24 hours    4 to 6 days    7 to 10 days    More than 10 days   Since your current symptoms started, has it been difficult to start, stop, or delay urination? Select one.    Yes   Not selected:    No   What color is your urine? Select one.    Cloudy   Not selected:    Clear    Yellow    Pink or red   Does your urine smell strange (like ammonia) or stronger than usual? Select one.    No   Not selected:    Yes   Do you also have any of these symptoms? Select all that apply.    Nausea    Pain, pressure, or discomfort in the lower abdomen   Not selected:    Fever    Vomiting    Back pain    No   How would you describe your lower abdominal pain, pressure, or discomfort? Select one.    Moderate; it's uncomfortable and gets in the way of doing daily tasks   Not selected:    Mild; I only notice it when I pay attention to it    Severe; I can't get comfortable, and it stops me from doing daily tasks   Do you have any flank pain? The flank is the side of the body between the ribs and the hips.    No   Not selected:    Yes, in my left flank    Yes, in my right flank    Yes, in both my left and right flanks   Do you have any of these vaginal symptoms? Select all that apply.    No   Not selected:    Abnormal vaginal itching    Unscheduled or abnormal vaginal bleeding or spotting    Pain during sex    Visible sores on the vagina    Abnormal vaginal discharge   In the past 2 weeks, have you had a medical device or instrument placed in your urinary tract? Examples include catheters, stents, and nephrostomy tubes. Select one.    No   Not selected:    Yes   Have you recently been hospitalized or  been a resident of a nursing home or other long-term care facility? This doesn't include emergency room (ER) visits. Select one.    No   Not selected:    Yes, within the last 2 weeks    Yes, within the last 3 months   Have you ever had severe problems with your kidneys, such as kidney failure? Select one.    No, not that I recall   Not selected:    Yes   Kidney stones    More than a year ago   Not selected:    Within the last year    No   Kidney infection (pyelonephritis)    No   Not selected:    Within the last year    More than a year ago   Have you ever been diagnosed with any of these? Select all that apply.    No   Not selected:    Urinary reflux    Bladder diverticula    Single (or horseshoe) kidney    Duplicated urethra   Have you recently held your urine for a long time after you felt the urge to go? Select one.    Yes   Not selected:    No   Have you recently avoided eating or drinking so you wouldn't have the urge to urinate as often? Select one.    No   Not selected:    Yes   Do you use a diaphragm? Select one.    No   Not selected:    Yes   Have you gone through menopause? Select one.    Yes   Not selected:    No    I'm going through it now   Have you had sexual intercourse in the past week? Recent sexual intercourse is a risk factor for urinary tract infections. Select one.    No   Not selected:    Yes   Have you had unprotected sexual intercourse with a new partner in the last 2 weeks? Select one.    No   Not selected:    Yes   Have you traveled to any of these countries within the last 3 months? Recent travel to these countries may affect which medication we recommend for your symptoms. Select all that apply.    None of these   Not selected:    [place]    [place]    [place]    [place]   Acetaminophen (Tylenol)    Not helpful   Not selected:    Helpful   Ibuprofen (Advil, Motrin)    Not helpful   Not selected:    Helpful   Phenazopyridine (Azo, Baridium, Pyridium, Uricalm, Uristat)    Not helpful   Not  selected:    Helpful   Have you ever had a urinary tract infection (UTI)? A UTI is often called a bladder infection or acute cystitis. Select one.    Yes   Not selected:    No, not that I know of   How much do your current symptoms feel like past UTIs? Select one.    Exactly the same   Not selected:    Mostly the same    Somewhat the same    Totally different   In the past year, how many times have you taken antibiotics for a UTI? Select one.    0   Not selected:    1 to 3    4 or more   Have you ever developed a yeast infection as a result of taking antibiotics? Select one.    No, not that I know of   Not selected:    Yes   UTIs may be more serious when other factors are present. Let's address those now. Are you being treated for type 1 or type 2 diabetes? Select one.    No   Not selected:    Yes   Do you have any of these conditions that can affect the immune system? Scroll to see all options. Select all that apply.    None of these   Not selected:    History of bone marrow transplant    Chronic kidney disease    Chronic liver disease (including cirrhosis)    HIV/AIDS    Inflammatory bowel disease (Crohn's disease or ulcerative colitis)    Lupus    Moderate to severe plaque psoriasis    Multiple sclerosis    Rheumatoid arthritis    Sickle cell anemia    Alpha or beta thalassemia    History of solid organ transplant (kidney, liver, or heart)    History of spleen removal    An autoimmune disorder not listed here (specify)    A condition requiring treatment with long-term use of oral steroids (such as prednisone, prednisolone, or dexamethasone) (specify)   Have you ever been diagnosed with cancer? Select one.    No   Not selected:    Yes, I have cancer now    Yes, but I'm in remission   These last few questions will help us create the right treatment plan for you. Are you being treated for any of these conditions? Select all that apply.    High blood pressure   Not selected:    Litzy-Danlos syndrome    Folate  deficiency    G6PD deficiency    History of aortic aneurysm or dissection    Marfan syndrome    Megaloblastic anemia    Mono (mononucleosis)    Myasthenia gravis    Oliguria or anuria    Peripheral vascular disease    No   Have you ever had jaundice as a result of taking amoxicillin-clavulanate (Augmentin) or nitrofurantoin (Macrobid)? Select all that apply.    No   Not selected:    Yes, from amoxicillin-clavulanate (Augmentin)    Yes, from nitrofurantoin (Macrobid, Macrodantin)   Are you taking any of these medications? Select all that apply.    No   Not selected:    An ACE inhibitor such as lisinopril, enalapril, captopril, or benazepril    An angiotensin II receptor blocker (ARB) such as candesartan, irbesartan, losartan, or valsartan   Are you still taking these medications listed in your medical record? If you're not taking any of these, click Next. Select all that apply.    omeprazole 40 MG capsule    triamterene-hydrochlorothiazide 37.5-25 MG per tablet    levothyroxine 125 MCG tablet    amLODIPine 10 MG tablet    traZODone 50 MG tablet    sertraline 100 MG tablet   Are you taking any other medications, vitamins, or supplements? Select one.    No   Not selected:    Yes   Have you ever had an allergic or bad reaction to any medication? Select one.    No   Not selected:    Yes   Do you need a doctor's note? A doctor's note confirms that you received care today and states when you can return to school or work. It does not contain information about your diagnosis or treatment plan. Your provider will make the final decision on whether to give you a doctor's note. Doctor's notes CANNOT be backdated. Select one.    No   Not selected:    Today only (1 day)    Today and tomorrow (2 days)    3 days   Is there anything you'd like to add about your symptoms? Please limit your comments to the symptoms asked about in this interview. If you include comments about other concerns, your provider may recommend that you be seen  in person.    My  has cancer, he and I both have COVID, I need some relief from this UTI, Please   ----------   Medical history   Medical history data does not currently exist for this patient.

## 2023-12-11 NOTE — EXTERNAL PATIENT INSTRUCTIONS
Note   Drink plenty of water and avoid caffeine If symptoms do not improve in 3-5 days follow up with your primary care provider or urgent care   Diagnosis   Urinary tract infection (UTI)   My name is SABRINA Sheridan. I'm a healthcare provider at [facility]. After reviewing your interview, I see you have a urinary tract infection (UTI).   Medications   Your pharmacy   [facility] [id number] [address] [place] [address] [address] [place] [address] [phone]     Prescription   Nitrofurantoin monohydrate/macrocrystalline (100mg): Take 1 capsule by mouth every 12 hours for 5 days for infection. This medication is an antibiotic. Take it exactly as directed. You must finish the entire course of medication, even if you feel better after taking the first few doses.   Phenazopyridine (200mg): Take 1 tablet by mouth 3 times a day as needed for 2 days for pain or discomfort associated with your condition.    I've given you a prescription dose of phenazopyridine. If it's more affordable or convenient, you may use the equivalent amount of non-prescription phenazopyridine. For example, instead of taking one 200 mg phenazopyridine tablet, you may take two 95 mg phenazopyridine tablets.   About your diagnosis   A UTI is an infection of one or more parts of the urinary tract, most commonly the bladder.   Most UTIs are caused by bacteria (usually E. coli) that travel up the urethra and into the bladder. I see that you have some common signs and symptoms of a UTI:    Pain or burning while urinating    Frequent urination    Symptoms that feel a lot like past UTIs    Mild or moderate pain, pressure, or discomfort in your lower abdomen    Cloudy urine   Fortunately, most UTIs aren't serious, and they're easily treated with antibiotics. Make sure you take all of the antibiotic pills given to you, even if you start to feel better after the first few doses. Otherwise, the UTI might come back.   What to expect   If you follow  this treatment plan, you should start to feel better within 1 to 2 days.   When to seek care   Call us at 1 [phone]   with any sudden or unexpected symptoms.    Symptoms that don't improve or get worse in the next 48 hours    Fever that goes above 101F or lasts longer than 24 hours    Shaking or chills    Nausea or vomiting    Severe flank pain (pain in your back or side)   Other treatment    Rest and drink plenty of water    Urinate frequently and when you first feel the urge    Place a heating pad on your back or stomach to help relieve some of the discomfort   Prevention    Drink a lot of liquids to help flush bacteria from your system. Water is best. Try for six to eight, 8-ounce glasses a day on a regular basis.    Urinate often and when you first feel the urge. Bacteria can grow when urine stays in the bladder too long. Urinate after sex to flush away bacteria.    After using the toilet, always wipe from front to back. This step is most important after a bowel movement. Wiping from front to back prevents bacteria normally found in stool from entering the urinary tract.   Your provider   Your diagnosis was provided by SABRINA Sheridan, a member of your trusted care team at [facility].   If you have any questions, call us at [phone]  .

## 2024-01-15 ENCOUNTER — OFFICE VISIT (OUTPATIENT)
Dept: FAMILY MEDICINE CLINIC | Facility: CLINIC | Age: 64
End: 2024-01-15
Payer: MEDICARE

## 2024-01-15 VITALS
DIASTOLIC BLOOD PRESSURE: 80 MMHG | WEIGHT: 175.3 LBS | HEART RATE: 75 BPM | HEIGHT: 63 IN | SYSTOLIC BLOOD PRESSURE: 124 MMHG | BODY MASS INDEX: 31.06 KG/M2 | OXYGEN SATURATION: 98 %

## 2024-01-15 DIAGNOSIS — L98.9 SKIN LESION: ICD-10-CM

## 2024-01-15 DIAGNOSIS — Z80.1 FAMILY HISTORY OF LUNG CANCER: ICD-10-CM

## 2024-01-15 DIAGNOSIS — Z87.891 FORMER SMOKER: ICD-10-CM

## 2024-01-15 DIAGNOSIS — F33.0 RECURRENT MAJOR DEPRESSIVE EPISODES, MILD: Primary | ICD-10-CM

## 2024-01-15 PROCEDURE — 3079F DIAST BP 80-89 MM HG: CPT | Performed by: PHYSICIAN ASSISTANT

## 2024-01-15 PROCEDURE — 99214 OFFICE O/P EST MOD 30 MIN: CPT | Performed by: PHYSICIAN ASSISTANT

## 2024-01-15 PROCEDURE — 3074F SYST BP LT 130 MM HG: CPT | Performed by: PHYSICIAN ASSISTANT

## 2024-01-15 NOTE — PROGRESS NOTES
"Chief Complaint  Depression (Pt is here for a med check and to ask about her depression medication dosage) and Cyst (Pt has a knot underneath her skin on her left side of her abdomen, Pt states it had previously been red in color)    Subjective          History of Present Illness  Nat Candelaria is here today with her depression    Patient states that she recently found out that her  has terminal cancer.  She states that she is now crying at the drop of a hat and feels like she has no get up and go left.  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  She states she does have people that she can talk to and feels like she can share with.  She denies any suicidal ideation or plans.  Currently taking Zoloft 50 mg.    Patient states that she noticed she had a small red spot on the left side of her abdomen a few days ago.  She states is no longer red but notices that she has palpable spot underneath it.  She states it is not painful and is no longer red and has not had any drainage.    Patient states that she has long history of lung cancer in her family and recently heard about low-dose CT scans and is interested in seeing if she is eligible for 1.  She is also a former smoker and smoked at least a pack a day for many years.  Objective   Vital Signs:   /80 (BP Location: Left arm, Patient Position: Sitting, Cuff Size: Adult)   Pulse 75   Ht 160 cm (63\")   Wt 79.5 kg (175 lb 4.8 oz)   SpO2 98%   BMI 31.05 kg/m²     Body mass index is 31.05 kg/m².     PHQ-9 Depression Screening  Little interest or pleasure in doing things? 2-->more than half the days   Feeling down, depressed, or hopeless? 2-->more than half the days   Trouble falling or staying asleep, or sleeping too much? 1-->several days   Feeling tired or having little energy? 2-->more than half the days   Poor appetite or overeating? 2-->more than half the days   Feeling bad about yourself - or that you are a " failure or have let yourself or your family down? 0-->not at all   Trouble concentrating on things, such as reading the newspaper or watching television? 1-->several days   Moving or speaking so slowly that other people could have noticed? Or the opposite - being so fidgety or restless that you have been moving around a lot more than usual? 3-->nearly every day   Thoughts that you would be better off dead, or of hurting yourself in some way? 0-->not at all   PHQ-9 Total Score 13   If you checked off any problems, how difficult have these problems made it for you to do your work, take care of things at home, or get along with other people? very difficult             Review of Systems      Current Outpatient Medications:   •  amLODIPine (NORVASC) 10 MG tablet, TAKE ONE TABLET BY MOUTH DAILY, Disp: 90 tablet, Rfl: 1  •  cyclobenzaprine (FLEXERIL) 5 MG tablet, Take 1 tablet by mouth 3 (Three) Times a Day., Disp: , Rfl:   •  levothyroxine (SYNTHROID, LEVOTHROID) 125 MCG tablet, Take 1 tablet by mouth Daily., Disp: 90 tablet, Rfl: 1  •  omeprazole (priLOSEC) 40 MG capsule, Take 1 capsule by mouth Daily., Disp: 90 capsule, Rfl: 2  •  sertraline (ZOLOFT) 100 MG tablet, TAKE 1 TABLET BY MOUTH DAILY, Disp: 90 tablet, Rfl: 1  •  traZODone (DESYREL) 50 MG tablet, TAKE 1 TO 2 TABLETS BY MOUTH AT NIGHT AS NEEDED FOR SLEEP, Disp: 180 tablet, Rfl: 1  •  triamterene-hydrochlorothiazide (MAXZIDE-25) 37.5-25 MG per tablet, TAKE ONE TABLET BY MOUTH DAILY, Disp: 90 tablet, Rfl: 1  •  HYDROcodone-acetaminophen (NORCO) 5-325 MG per tablet, Take 1 tablet by mouth Every 6 (Six) Hours As Needed for Moderate Pain. (Patient not taking: Reported on 1/15/2024), Disp: 15 tablet, Rfl: 0  •  sertraline (Zoloft) 50 MG tablet, Take 1 tablet by mouth Daily., Disp: 30 tablet, Rfl: 1  •  triamcinolone (KENALOG) 0.1 % cream, Apply 1 application topically to the appropriate area as directed 2 (Two) Times a Day As Needed for Rash., Disp: 45 g, Rfl:  0    Allergies: Patient has no known allergies.    Physical Exam  Vitals and nursing note reviewed.   Constitutional:       General: She is not in acute distress.     Appearance: Normal appearance. She is not ill-appearing, toxic-appearing or diaphoretic.   HENT:      Head: Normocephalic and atraumatic.   Cardiovascular:      Rate and Rhythm: Normal rate and regular rhythm.      Heart sounds: Normal heart sounds.   Pulmonary:      Effort: Pulmonary effort is normal.      Breath sounds: Normal breath sounds.   Abdominal:       Neurological:      General: No focal deficit present.      Mental Status: She is alert.   Psychiatric:         Mood and Affect: Mood normal.        Result Review :                   Assessment and Plan    Diagnoses and all orders for this visit:    1. Recurrent major depressive episodes, mild (Primary)  -     sertraline (Zoloft) 100MG tablet; Take 1 tablet by mouth Daily.  Dispense: 30 tablet; Refill: 1  Will increase Zoloft to 100 mg daily.  2. Skin lesion  Reassurance.  I can palpate small mobile nontender less than half a centimeter subcutaneous lipoma.  If becomes painful or enlarges she is to let us know  3. Former smoker  -      CT Chest Low Dose Cancer Screening WO; Future    4. Family history of lung cancer  -      CT Chest Low Dose Cancer Screening WO; Future        Follow Up   Return in about 2 months (around 3/15/2024) for Annual with HERNADEZ als a follow up in 2-3 weeks for depression can tele with me or ham.  Patient was given instructions and counseling regarding her condition or for health maintenance advice. Please see specific information pulled into the AVS if appropriate.     DL Ibarra  01/15/2024

## 2024-01-31 RX ORDER — AMLODIPINE BESYLATE 10 MG/1
10 TABLET ORAL DAILY
Qty: 90 TABLET | Refills: 0 | Status: SHIPPED | OUTPATIENT
Start: 2024-01-31

## 2024-01-31 NOTE — TELEPHONE ENCOUNTER
Pt is due for annual physical, please call and schedule pt with PCP. Rx sent x90 days until seen in office.

## 2024-02-12 ENCOUNTER — LAB (OUTPATIENT)
Dept: FAMILY MEDICINE CLINIC | Facility: CLINIC | Age: 64
End: 2024-02-12
Payer: MEDICARE

## 2024-02-12 ENCOUNTER — TELEPHONE (OUTPATIENT)
Dept: ENDOCRINOLOGY | Facility: CLINIC | Age: 64
End: 2024-02-12

## 2024-02-12 DIAGNOSIS — E03.9 ACQUIRED HYPOTHYROIDISM: ICD-10-CM

## 2024-02-12 PROCEDURE — 36415 COLL VENOUS BLD VENIPUNCTURE: CPT | Performed by: HOSPITALIST

## 2024-02-12 NOTE — TELEPHONE ENCOUNTER
Caller: Nat Candelaria    Relationship to patient: Self    Best call back number: [phone]     Patient is needing:  PATIENT STATES SHE IS SUPPOSED TO HAVE LAB WORK DRAWN SOMEWHERE IN [place], BUT SHE DOESN'T KNOW WHERE TO GO.  PATIENT IS WANTING TO HAVE HER LABS DONE TODAY 2/12/24 IF POSSIBLE.  PLEASE CONTACT PATIENT TO ADVISE, THANK YOU.

## 2024-02-13 ENCOUNTER — TELEMEDICINE (OUTPATIENT)
Dept: FAMILY MEDICINE CLINIC | Facility: CLINIC | Age: 64
End: 2024-02-13
Payer: MEDICARE

## 2024-02-13 VITALS — HEART RATE: 69 BPM | SYSTOLIC BLOOD PRESSURE: 129 MMHG | DIASTOLIC BLOOD PRESSURE: 72 MMHG

## 2024-02-13 DIAGNOSIS — J30.89 NON-SEASONAL ALLERGIC RHINITIS DUE TO OTHER ALLERGIC TRIGGER: ICD-10-CM

## 2024-02-13 DIAGNOSIS — J01.00 ACUTE NON-RECURRENT MAXILLARY SINUSITIS: Primary | ICD-10-CM

## 2024-02-13 DIAGNOSIS — F33.0 RECURRENT MAJOR DEPRESSIVE EPISODES, MILD: ICD-10-CM

## 2024-02-13 PROBLEM — J30.9 ALLERGIC RHINITIS DUE TO ALLERGEN: Status: ACTIVE | Noted: 2024-02-13

## 2024-02-13 LAB
T4 FREE SERPL-MCNC: 1.49 NG/DL (ref 0.82–1.77)
TSH SERPL DL<=0.005 MIU/L-ACNC: 1.45 UIU/ML (ref 0.45–4.5)

## 2024-02-13 PROCEDURE — 3078F DIAST BP <80 MM HG: CPT | Performed by: PHYSICIAN ASSISTANT

## 2024-02-13 PROCEDURE — 99214 OFFICE O/P EST MOD 30 MIN: CPT | Performed by: PHYSICIAN ASSISTANT

## 2024-02-13 PROCEDURE — 3074F SYST BP LT 130 MM HG: CPT | Performed by: PHYSICIAN ASSISTANT

## 2024-02-13 RX ORDER — CEFDINIR 300 MG/1
300 CAPSULE ORAL 2 TIMES DAILY
Qty: 14 CAPSULE | Refills: 0 | Status: SHIPPED | OUTPATIENT
Start: 2024-02-13 | End: 2024-02-20

## 2024-02-13 RX ORDER — SERTRALINE HYDROCHLORIDE 25 MG/1
25 TABLET, FILM COATED ORAL DAILY
Qty: 30 TABLET | Refills: 2 | Status: SHIPPED | OUTPATIENT
Start: 2024-02-13

## 2024-02-15 RX ORDER — TRIAMTERENE AND HYDROCHLOROTHIAZIDE 37.5; 25 MG/1; MG/1
1 TABLET ORAL DAILY
Qty: 90 TABLET | Refills: 1 | Status: SHIPPED | OUTPATIENT
Start: 2024-02-15

## 2024-02-27 ENCOUNTER — TELEMEDICINE (OUTPATIENT)
Dept: FAMILY MEDICINE CLINIC | Facility: CLINIC | Age: 64
End: 2024-02-27
Payer: MEDICARE

## 2024-02-27 VITALS
WEIGHT: 170 LBS | SYSTOLIC BLOOD PRESSURE: 148 MMHG | BODY MASS INDEX: 30.11 KG/M2 | HEART RATE: 71 BPM | DIASTOLIC BLOOD PRESSURE: 72 MMHG | TEMPERATURE: 97.1 F

## 2024-02-27 DIAGNOSIS — F33.0 RECURRENT MAJOR DEPRESSIVE EPISODES, MILD: ICD-10-CM

## 2024-02-27 DIAGNOSIS — J01.01 ACUTE RECURRENT MAXILLARY SINUSITIS: Primary | ICD-10-CM

## 2024-02-27 PROCEDURE — 3078F DIAST BP <80 MM HG: CPT | Performed by: PHYSICIAN ASSISTANT

## 2024-02-27 PROCEDURE — 99214 OFFICE O/P EST MOD 30 MIN: CPT | Performed by: PHYSICIAN ASSISTANT

## 2024-02-27 PROCEDURE — 3077F SYST BP >= 140 MM HG: CPT | Performed by: PHYSICIAN ASSISTANT

## 2024-02-27 RX ORDER — CEFDINIR 300 MG/1
300 CAPSULE ORAL 2 TIMES DAILY
Qty: 20 CAPSULE | Refills: 0 | Status: SHIPPED | OUTPATIENT
Start: 2024-02-27 | End: 2024-03-08

## 2024-02-27 RX ORDER — PREDNISONE 20 MG/1
TABLET ORAL
Qty: 9 TABLET | Refills: 0 | Status: SHIPPED | OUTPATIENT
Start: 2024-02-27

## 2024-02-27 NOTE — PROGRESS NOTES
.    Mode of Visit: Video  Location of patient: home  You have chosen to receive care through a telehealth visit.  The patient has signed the video visit consent form.  The visit included audio and video interaction. No technical issues occurred during this visit.     Discussed limitations to virtual visit and patient was agreeable to continue. Discussed that because of the limitations of minimal physical exam that if there is interval worsening they should present to the office for either curbside visit, or to the emergency department for further evaluation and definitive management. Patient was agreeable to this.    Mode of Visit: Video (Total Boox)  Location of patient: home  Location of provider: [facility] [place] [facility]  You have chosen to receive care through a telehealth visit.  Does the patient consent to use a video/audio connection for your medical care today? Yes  The visit included audio and video interaction.During this             Chief Complaint  Depression (Follow up ) and Sinusitis (Still not better after Rx completed.)      Nat Candelaria presents to [facility]  Patient was seen in the office about 2 weeks ago and diagnosed with a sinus infection.  She was placed on Omnicef and states that she did not get much better on medication.  She states she continues to have a lot of sinus pressure and pain especially on her forehead.  She states that her cheeks hurt as well.  She continues to have yellow drainage.  She does continue to take her allergy medicines daily as well.    Patient states she did start the 120 mg dose of her Zoloft which seems to be working well.  She states she is feeling better.  She states that she is having some trouble distinguishing as she has been sick but she has not had any heart racing with the increased dose.      Review of Systems    Objective   Vital Signs:   /72   Pulse 71   Temp 97.1 °F (36.2 °C) (Oral)   Wt 77.1 kg (170 lb)   BMI 30.11 kg/m²      Physical Exam   Constitutional: She appears well-developed and well-nourished. No distress.   HENT:   Head: Normocephalic and atraumatic.   Pulmonary/Chest: Effort normal.   Skin: She is not diaphoretic.   Psychiatric: She has a normal mood and affect.           PHQ-9 Depression Screening  Little interest or pleasure in doing things? 1-->several days   Feeling down, depressed, or hopeless? 1-->several days   Trouble falling or staying asleep, or sleeping too much? 0-->not at all   Feeling tired or having little energy? 2-->more than half the days   Poor appetite or overeating? 0-->not at all   Feeling bad about yourself - or that you are a failure or have let yourself or your family down? 0-->not at all   Trouble concentrating on things, such as reading the newspaper or watching television? 1-->several days   Moving or speaking so slowly that other people could have noticed? Or the opposite - being so fidgety or restless that you have been moving around a lot more than usual? 0-->not at all   Thoughts that you would be better off dead, or of hurting yourself in some way? 0-->not at all   PHQ-9 Total Score 5   If you checked off any problems, how difficult have these problems made it for you to do your work, take care of things at home, or get along with other people? somewhat difficult   LUIS MANUEL-7      Over the last two weeks, how often have you been bothered by the following problems?  Feeling nervous, anxious or on edge: Not at all  Not being able to stop or control worrying: Not at all  Worrying too much about different things: Not at all  Trouble Relaxing: Several days  Being so restless that it is hard to sit still: Several days  Becoming easily annoyed or irritable: Several days  Feeling afraid as if something awful might happen: Not at all  LUIS MANUEL 7 Total Score: 3  If you checked any problems, how difficult have these problems made it for you to do your work, take care of things at home, or get along with other  people: Somewhat difficult    Telemedicine with Sari Glass PA (02/13/2024)      Assessment and Plan    Diagnoses and all orders for this visit:    1. Acute recurrent maxillary sinusitis (Primary)  -     predniSONE (DELTASONE) 20 MG tablet; 2 tab po qd x 3 days the 1 tab po qd x 3 days  Dispense: 9 tablet; Refill: 0  -     cefdinir (OMNICEF) 300 MG capsule; Take 1 capsule by mouth 2 (Two) Times a Day for 10 days.  Dispense: 20 capsule; Refill: 0  We will add prednisone as well as repeating her Omnicef for her recurrent sinus infection.  Will check in with the patient again to see if we do not get her sinus infection resolved.  Will have her continue her allergy medicines as well.  2. Recurrent major depressive episodes, mild  Improving we will have her continue her current Zoloft 125 mg dosing.  We will check up with her in a couple of weeks.          Follow Up   Return in about 1 month (around 3/27/2024), or if symptoms worsen or fail to improve, for sinus infection and anxiety .  Patient was given instructions and counseling regarding her condition or for health maintenance advice. Please see specific information pulled into the AVS if appropriate.

## 2024-03-28 ENCOUNTER — TELEMEDICINE (OUTPATIENT)
Dept: FAMILY MEDICINE CLINIC | Facility: CLINIC | Age: 64
End: 2024-03-28
Payer: MEDICARE

## 2024-03-28 VITALS
SYSTOLIC BLOOD PRESSURE: 123 MMHG | BODY MASS INDEX: 29.77 KG/M2 | HEART RATE: 73 BPM | HEIGHT: 63 IN | WEIGHT: 168 LBS | DIASTOLIC BLOOD PRESSURE: 75 MMHG

## 2024-03-28 DIAGNOSIS — F33.0 RECURRENT MAJOR DEPRESSIVE EPISODES, MILD: Primary | ICD-10-CM

## 2024-03-28 PROCEDURE — 3074F SYST BP LT 130 MM HG: CPT | Performed by: PHYSICIAN ASSISTANT

## 2024-03-28 PROCEDURE — 99213 OFFICE O/P EST LOW 20 MIN: CPT | Performed by: PHYSICIAN ASSISTANT

## 2024-03-28 PROCEDURE — 3078F DIAST BP <80 MM HG: CPT | Performed by: PHYSICIAN ASSISTANT

## 2024-03-28 NOTE — PROGRESS NOTES
".    Mode of Visit: Video  Location of patient: home  You have chosen to receive care through a telehealth visit.  The patient has signed the video visit consent form.  The visit included audio and video interaction. No technical issues occurred during this visit.     Discussed limitations to virtual visit and patient was agreeable to continue. Discussed that because of the limitations of minimal physical exam that if there is interval worsening they should present to the office for either curbside visit, or to the emergency department for further evaluation and definitive management. Patient was agreeable to this.    Mode of Visit: Video (Memorandom)  Location of patient: home  Location of provider: [facility] [place] [facility]  You have chosen to receive care through a telehealth visit.  Does the patient consent to use a video/audio connection for your medical care today? Yes  The visit included audio and video interaction. No technical issues occurred during this             Chief Complaint  Depression (Follow up )      Nat Candelaria presents to [facility]    Patient staets that her depression and anxiety are much better  Sh states that she  is doing great on 100mg zoloft and no issues. She states the courtney weather is helping  as well. She states sleeping well for the most part.  She states that she is now out walking daily and trying to not snack at night   Review of Systems    Objective   Vital Signs:   /75   Pulse 73   Ht 160 cm (63\")   Wt 76.2 kg (168 lb)   BMI 29.76 kg/m²     Physical Exam   Constitutional: She appears well-developed and well-nourished.   HENT:   Head: Normocephalic and atraumatic.   Pulmonary/Chest: Effort normal.   Psychiatric: She has a normal mood and affect.             LUIS MANUEL-7      Over the last two weeks, how often have you been bothered by the following problems?  Feeling nervous, anxious or on edge: Not at all  Not being able to stop or control worrying: Not at " all  Worrying too much about different things: Several days  Trouble Relaxing: Nearly every day  Being so restless that it is hard to sit still: Not at all  Becoming easily annoyed or irritable: Not at all  Feeling afraid as if something awful might happen: Not at all  LUIS MANUEL 7 Total Score: 4  If you checked any problems, how difficult have these problems made it for you to do your work, take care of things at home, or get along with other people: Not difficult at all    PHQ-9 Depression Screening  Little interest or pleasure in doing things? 0-->not at all   Feeling down, depressed, or hopeless? 0-->not at all   Trouble falling or staying asleep, or sleeping too much?     Feeling tired or having little energy?     Poor appetite or overeating?     Feeling bad about yourself - or that you are a failure or have let yourself or your family down?     Trouble concentrating on things, such as reading the newspaper or watching television?     Moving or speaking so slowly that other people could have noticed? Or the opposite - being so fidgety or restless that you have been moving around a lot more than usual?     Thoughts that you would be better off dead, or of hurting yourself in some way?     PHQ-9 Total Score 0   If you checked off any problems, how difficult have these problems made it for you to do your work, take care of things at home, or get along with other people?            Assessment and Plan    Diagnoses and all orders for this visit:    1. Recurrent major depressive episodes, mild (Primary)      Continue current medication and will continue to monitor- If patient has any concerns she is to call out office       Follow Up   No follow-ups on file.  Patient was given instructions and counseling regarding her condition or for health maintenance advice. Please see specific information pulled into the AVS if appropriate.

## 2024-04-11 RX ORDER — TRAZODONE HYDROCHLORIDE 50 MG/1
TABLET ORAL
Qty: 180 TABLET | Refills: 1 | Status: SHIPPED | OUTPATIENT
Start: 2024-04-11

## 2024-05-02 RX ORDER — AMLODIPINE BESYLATE 10 MG/1
10 TABLET ORAL DAILY
Qty: 90 TABLET | Refills: 0 | Status: SHIPPED | OUTPATIENT
Start: 2024-05-02

## 2024-05-06 ENCOUNTER — TELEPHONE (OUTPATIENT)
Dept: FAMILY MEDICINE CLINIC | Facility: CLINIC | Age: 64
End: 2024-05-06
Payer: MEDICARE

## 2024-05-07 ENCOUNTER — OFFICE VISIT (OUTPATIENT)
Dept: FAMILY MEDICINE CLINIC | Facility: CLINIC | Age: 64
End: 2024-05-07
Payer: MEDICARE

## 2024-05-07 VITALS
BODY MASS INDEX: 31.18 KG/M2 | HEART RATE: 70 BPM | HEIGHT: 63 IN | DIASTOLIC BLOOD PRESSURE: 88 MMHG | WEIGHT: 176 LBS | SYSTOLIC BLOOD PRESSURE: 136 MMHG | OXYGEN SATURATION: 95 %

## 2024-05-07 DIAGNOSIS — N63.12 LUMP IN UPPER INNER QUADRANT OF RIGHT BREAST: Primary | ICD-10-CM

## 2024-05-07 PROCEDURE — 1125F AMNT PAIN NOTED PAIN PRSNT: CPT | Performed by: PHYSICIAN ASSISTANT

## 2024-05-07 PROCEDURE — 3075F SYST BP GE 130 - 139MM HG: CPT | Performed by: PHYSICIAN ASSISTANT

## 2024-05-07 PROCEDURE — 99213 OFFICE O/P EST LOW 20 MIN: CPT | Performed by: PHYSICIAN ASSISTANT

## 2024-05-07 PROCEDURE — 3079F DIAST BP 80-89 MM HG: CPT | Performed by: PHYSICIAN ASSISTANT

## 2024-05-07 NOTE — PROGRESS NOTES
"Chief Complaint  Breast Mass (Pt found small lump in rt breast, needs referral for mammogram. )    Subjective          History of Present Illness  Nat Candelaria is here today with breast mass  Patient states for the 2 months she has noticed a small lump in her right breast.  She states that it just felt like a small lump.  She states that every now and then skin over the lump becomes red and then the spot turns back to normal looking skin.  Her last mammography was in January 2023.  She states that it does not bother her unless she has been poking around on it.  She states that her mother had breast cancer.  She states that she had a lump removed 20 years ago and was benign.    Objective   Vital Signs:   /88   Pulse 70   Ht 160 cm (63\")   Wt 79.8 kg (176 lb)   SpO2 95%   BMI 31.18 kg/m²     Body mass index is 31.18 kg/m².         Review of Systems      Current Outpatient Medications:     amLODIPine (NORVASC) 10 MG tablet, TAKE 1 TABLET BY MOUTH DAILY, Disp: 90 tablet, Rfl: 0    cyclobenzaprine (FLEXERIL) 5 MG tablet, Take 1 tablet by mouth 3 (Three) Times a Day., Disp: , Rfl:     HYDROcodone-acetaminophen (NORCO) 5-325 MG per tablet, Take 1 tablet by mouth Every 6 (Six) Hours As Needed for Moderate Pain., Disp: 15 tablet, Rfl: 0    levothyroxine (SYNTHROID, LEVOTHROID) 125 MCG tablet, Take 1 tablet by mouth Daily., Disp: 90 tablet, Rfl: 1    omeprazole (priLOSEC) 40 MG capsule, Take 1 capsule by mouth Daily., Disp: 90 capsule, Rfl: 2    sertraline (Zoloft) 25 MG tablet, Take 1 tablet by mouth Daily., Disp: 30 tablet, Rfl: 2    traZODone (DESYREL) 50 MG tablet, TAKE 1 TO 2 TABLETS BY MOUTH AT NIGHT AS NEEDED FOR SLEEP, Disp: 180 tablet, Rfl: 1    triamcinolone (KENALOG) 0.1 % cream, Apply 1 application topically to the appropriate area as directed 2 (Two) Times a Day As Needed for Rash., Disp: 45 g, Rfl: 0    triamterene-hydrochlorothiazide (MAXZIDE-25) 37.5-25 MG per tablet, TAKE 1 TABLET BY MOUTH DAILY, " Disp: 90 tablet, Rfl: 1    sertraline (ZOLOFT) 100 MG tablet, TAKE 1 TABLET BY MOUTH DAILY, Disp: 90 tablet, Rfl: 0    Allergies: Patient has no known allergies.    Physical Exam  Vitals and nursing note reviewed.   Constitutional:       General: She is not in acute distress.     Appearance: Normal appearance. She is not ill-appearing, toxic-appearing or diaphoretic.   HENT:      Head: Normocephalic and atraumatic.   Cardiovascular:      Rate and Rhythm: Normal rate and regular rhythm.      Heart sounds: Normal heart sounds.   Pulmonary:      Breath sounds: Normal breath sounds.   Chest:       Neurological:      Mental Status: She is alert.        Result Review :                   Assessment and Plan    Diagnoses and all orders for this visit:    1. Lump in upper inner quadrant of right breast (Primary)  -     Mammo Diagnostic Digital Tomosynthesis Right With CAD; Future    Will send for mammo for further eval    Follow Up   No follow-ups on file.  Patient was given instructions and counseling regarding her condition or for health maintenance advice. Please see specific information pulled into the AVS if appropriate.     DL Ibarra  05/07/2024

## 2024-05-13 RX ORDER — SERTRALINE HYDROCHLORIDE 100 MG/1
100 TABLET, FILM COATED ORAL DAILY
Qty: 90 TABLET | Refills: 0 | Status: SHIPPED | OUTPATIENT
Start: 2024-05-13

## 2024-06-07 ENCOUNTER — HOSPITAL ENCOUNTER (OUTPATIENT)
Dept: MAMMOGRAPHY | Facility: HOSPITAL | Age: 64
Discharge: HOME | End: 2024-06-07
Payer: MEDICARE

## 2024-06-07 ENCOUNTER — HOSPITAL ENCOUNTER (OUTPATIENT)
Dept: ULTRASOUND IMAGING | Facility: HOSPITAL | Age: 64
Discharge: HOME | End: 2024-06-07
Payer: MEDICARE

## 2024-06-07 DIAGNOSIS — N63.12 LUMP IN UPPER INNER QUADRANT OF RIGHT BREAST: ICD-10-CM

## 2024-06-07 PROCEDURE — G0279 TOMOSYNTHESIS, MAMMO: HCPCS

## 2024-06-07 PROCEDURE — 76642 ULTRASOUND BREAST LIMITED: CPT

## 2024-06-07 PROCEDURE — 77066 DX MAMMO INCL CAD BI: CPT

## 2024-06-07 NOTE — PROGRESS NOTES
Please call patient and let her know that we have a copy of her mammogram and can we make an appointment with her to recheck same area of breast in 2-3 months thanks

## 2024-06-10 DIAGNOSIS — F33.0 RECURRENT MAJOR DEPRESSIVE EPISODES, MILD: ICD-10-CM

## 2024-06-10 RX ORDER — SERTRALINE HYDROCHLORIDE 25 MG/1
25 TABLET, FILM COATED ORAL DAILY
Qty: 30 TABLET | Refills: 2 | Status: SHIPPED | OUTPATIENT
Start: 2024-06-10

## 2024-06-21 ENCOUNTER — OFFICE VISIT (OUTPATIENT)
Dept: FAMILY MEDICINE CLINIC | Facility: CLINIC | Age: 64
End: 2024-06-21
Payer: MEDICARE

## 2024-06-21 VITALS
BODY MASS INDEX: 31.01 KG/M2 | DIASTOLIC BLOOD PRESSURE: 86 MMHG | OXYGEN SATURATION: 96 % | SYSTOLIC BLOOD PRESSURE: 136 MMHG | HEIGHT: 63 IN | HEART RATE: 62 BPM | WEIGHT: 175 LBS

## 2024-06-21 DIAGNOSIS — F41.1 GENERALIZED ANXIETY DISORDER: Primary | ICD-10-CM

## 2024-06-21 DIAGNOSIS — F33.0 RECURRENT MAJOR DEPRESSIVE EPISODES, MILD: ICD-10-CM

## 2024-06-21 PROCEDURE — 99214 OFFICE O/P EST MOD 30 MIN: CPT | Performed by: PHYSICIAN ASSISTANT

## 2024-06-21 PROCEDURE — 3079F DIAST BP 80-89 MM HG: CPT | Performed by: PHYSICIAN ASSISTANT

## 2024-06-21 PROCEDURE — 3075F SYST BP GE 130 - 139MM HG: CPT | Performed by: PHYSICIAN ASSISTANT

## 2024-06-21 PROCEDURE — 1125F AMNT PAIN NOTED PAIN PRSNT: CPT | Performed by: PHYSICIAN ASSISTANT

## 2024-06-21 RX ORDER — SERTRALINE HYDROCHLORIDE 25 MG/1
25 TABLET, FILM COATED ORAL DAILY
Qty: 90 TABLET | Refills: 0 | Status: SHIPPED | OUTPATIENT
Start: 2024-06-21

## 2024-06-21 RX ORDER — SERTRALINE HYDROCHLORIDE 100 MG/1
100 TABLET, FILM COATED ORAL DAILY
Qty: 90 TABLET | Refills: 0 | Status: SHIPPED | OUTPATIENT
Start: 2024-06-21

## 2024-06-21 RX ORDER — BUSPIRONE HYDROCHLORIDE 5 MG/1
5 TABLET ORAL DAILY PRN
Qty: 30 TABLET | Refills: 1 | Status: SHIPPED | OUTPATIENT
Start: 2024-06-21

## 2024-06-21 NOTE — PROGRESS NOTES
".Chief Complaint  Depression (Med recheck ) and Anxiety    Subjective          History of Present Illness  Nat Candelaria is here today with her  History of Present Illness  The patient is a 63-year-old female who is here for follow-up on her depression.    The patient's depression and anxiety are increased of late. Her 's cancer diagnosis has led to heightened emotional distress, leading to heightened anxiety levels. She expresses a desire to explore pharmacological interventions. Her sleep pattern is satisfactory, attributable to her use of trazodone. Previous attempts to increase her Zoloft dosage to 150 mg resulted in tachycardia.    Supplemental Information  She has undergone 3 neck surgeries and continues to have daily neck pain.    Objective   Vital Signs:   /86   Pulse 62   Ht 160 cm (63\")   Wt 79.4 kg (175 lb)   SpO2 96%   BMI 31.00 kg/m²     Body mass index is 31 kg/m².  BMI is >= 30 and <35. (Class 1 Obesity). The following options were offered after discussion;: information on healthy weight added to patient's after visit summary       Review of Systems  PHQ-9 Depression Screening  Little interest or pleasure in doing things? 1-->several days   Feeling down, depressed, or hopeless? 1-->several days   Trouble falling or staying asleep, or sleeping too much? 0-->not at all   Feeling tired or having little energy? 3-->nearly every day   Poor appetite or overeating? 3-->nearly every day   Feeling bad about yourself - or that you are a failure or have let yourself or your family down? 0-->not at all   Trouble concentrating on things, such as reading the newspaper or watching television? 3-->nearly every day   Moving or speaking so slowly that other people could have noticed? Or the opposite - being so fidgety or restless that you have been moving around a lot more than usual? 0-->not at all   Thoughts that you would be better off dead, or of hurting yourself in some way? 0-->not at all "   PHQ-9 Total Score 11   If you checked off any problems, how difficult have these problems made it for you to do your work, take care of things at home, or get along with other people? somewhat difficult   LUIS MANUEL-7      Over the last two weeks, how often have you been bothered by the following problems?  Feeling nervous, anxious or on edge: Several days  Not being able to stop or control worrying: Not at all  Worrying too much about different things: Not at all  Trouble Relaxing: Nearly every day  Being so restless that it is hard to sit still: Nearly every day  Becoming easily annoyed or irritable: Several days  Feeling afraid as if something awful might happen: Not at all  LUIS MANUEL 7 Total Score: 8  If you checked any problems, how difficult have these problems made it for you to do your work, take care of things at home, or get along with other people: Somewhat difficult    Current Outpatient Medications:   •  amLODIPine (NORVASC) 10 MG tablet, TAKE 1 TABLET BY MOUTH DAILY, Disp: 90 tablet, Rfl: 0  •  cyclobenzaprine (FLEXERIL) 5 MG tablet, Take 1 tablet by mouth 3 (Three) Times a Day., Disp: , Rfl:   •  HYDROcodone-acetaminophen (NORCO) 5-325 MG per tablet, Take 1 tablet by mouth Every 6 (Six) Hours As Needed for Moderate Pain., Disp: 15 tablet, Rfl: 0  •  levothyroxine (SYNTHROID, LEVOTHROID) 125 MCG tablet, Take 1 tablet by mouth Daily., Disp: 90 tablet, Rfl: 1  •  omeprazole (priLOSEC) 40 MG capsule, Take 1 capsule by mouth Daily., Disp: 90 capsule, Rfl: 2  •  sertraline (ZOLOFT) 100 MG tablet, Take 1 tablet by mouth Daily., Disp: 90 tablet, Rfl: 0  •  sertraline (ZOLOFT) 25 MG tablet, Take 1 tablet by mouth Daily., Disp: 90 tablet, Rfl: 0  •  traZODone (DESYREL) 50 MG tablet, TAKE 1 TO 2 TABLETS BY MOUTH AT NIGHT AS NEEDED FOR SLEEP, Disp: 180 tablet, Rfl: 1  •  triamcinolone (KENALOG) 0.1 % cream, Apply 1 application topically to the appropriate area as directed 2 (Two) Times a Day As Needed for Rash., Disp: 45 g,  Rfl: 0  •  triamterene-hydrochlorothiazide (MAXZIDE-25) 37.5-25 MG per tablet, TAKE 1 TABLET BY MOUTH DAILY, Disp: 90 tablet, Rfl: 1  •  busPIRone (BUSPAR) 5 MG tablet, Take 1 tablet by mouth Daily As Needed (for anxiety)., Disp: 30 tablet, Rfl: 1    Allergies: Patient has no known allergies.    Physical Exam  Vitals and nursing note reviewed.   Constitutional:       Appearance: Normal appearance.   HENT:      Head: Normocephalic and atraumatic.   Cardiovascular:      Rate and Rhythm: Normal rate and regular rhythm.      Heart sounds: Normal heart sounds.   Pulmonary:      Effort: Pulmonary effort is normal.      Breath sounds: Normal breath sounds.   Neurological:      General: No focal deficit present.      Mental Status: She is alert.   Psychiatric:         Mood and Affect: Mood normal.         Behavior: Behavior normal.      Physical Exam      Result Review :          Results               Assessment and Plan    Diagnoses and all orders for this visit:    1. Generalized anxiety disorder (Primary)    2. Recurrent major depressive episodes, mild  -     sertraline (ZOLOFT) 25 MG tablet; Take 1 tablet by mouth Daily.  Dispense: 90 tablet; Refill: 0    -     busPIRone (BUSPAR) 5 MG tablet; Take 1 tablet by mouth Daily As Needed (for anxiety).  Dispense: 30 tablet; Refill: 1  -     sertraline (ZOLOFT) 100 MG tablet; Take 1 tablet by mouth Daily.  Dispense: 90 tablet; Refill: 0    Continue her Zoloft at 125 mg a day but will add BuSpar 5 mg to help with her increasing anxiety.  Will follow-up with her in the next couple of weeks to make sure this is making things better for her.  Assessment & Plan        Follow Up   Return in about 2 weeks (around 7/5/2024) for Recheck can do telehealth .  Patient was given instructions and counseling regarding her condition or for health maintenance advice. Please see specific information pulled into the AVS if appropriate.     Patient or patient representative verbalized consent for  the use of Ambient Listening during the visit with  DL Ibarra for chart documentation. 6/21/2024  11:03 EDT    DL Ibarra  06/21/2024

## 2024-07-08 ENCOUNTER — OFFICE VISIT (OUTPATIENT)
Dept: FAMILY MEDICINE CLINIC | Facility: CLINIC | Age: 64
End: 2024-07-08
Payer: MEDICARE

## 2024-07-08 VITALS
BODY MASS INDEX: 31.01 KG/M2 | DIASTOLIC BLOOD PRESSURE: 62 MMHG | HEART RATE: 76 BPM | WEIGHT: 175 LBS | HEIGHT: 63 IN | OXYGEN SATURATION: 95 % | SYSTOLIC BLOOD PRESSURE: 118 MMHG

## 2024-07-08 DIAGNOSIS — M25.571 ARTHRALGIA OF BOTH ANKLES: ICD-10-CM

## 2024-07-08 DIAGNOSIS — F41.1 GENERALIZED ANXIETY DISORDER: Primary | ICD-10-CM

## 2024-07-08 DIAGNOSIS — M25.572 ARTHRALGIA OF BOTH ANKLES: ICD-10-CM

## 2024-07-08 DIAGNOSIS — F33.0 RECURRENT MAJOR DEPRESSIVE EPISODES, MILD: ICD-10-CM

## 2024-07-08 DIAGNOSIS — G44.89 OTHER HEADACHE SYNDROME: ICD-10-CM

## 2024-07-08 PROCEDURE — 1125F AMNT PAIN NOTED PAIN PRSNT: CPT | Performed by: PHYSICIAN ASSISTANT

## 2024-07-08 PROCEDURE — 99214 OFFICE O/P EST MOD 30 MIN: CPT | Performed by: PHYSICIAN ASSISTANT

## 2024-07-08 PROCEDURE — 3078F DIAST BP <80 MM HG: CPT | Performed by: PHYSICIAN ASSISTANT

## 2024-07-08 PROCEDURE — 3074F SYST BP LT 130 MM HG: CPT | Performed by: PHYSICIAN ASSISTANT

## 2024-07-08 NOTE — PROGRESS NOTES
".Chief Complaint  Anxiety (2 wk follow up )    Subjective          History of Present Illness  Nat Candelaria is here today with her  History of Present Illness  The patient is a 63-year-old female who is here for follow-up on her anxiety.    The patient has utilized the prescribed BuSpar on a few occasions, which she reports has been effective in managing her anxiety.    Approximately two weeks ago, the patient experienced severe ankle pain that impeded her mobility, particularly upon waking in the morning. The pain intensifies as the day progresses.    The patient has a longstanding intermittent rash that has been present for approximately two years. The rash, which she describes as warm and slightly red, is not raised but resembling small dots, extends upwards, not past her knee on both legs. This rash is accompanied by extreme fatigue.    Supplemental Information  She has ankylosing spondylosis and degenerative disc disease. She has had 3 neck surgeries. She has spells where she feels like there is fluid in her head. This past week, when she bends over, about go down or turn, she almost falls backwards. She is having headaches in the center of the top of her head that wake her at night     Objective   Vital Signs:   /62   Pulse 76   Ht 160 cm (63\")   Wt 79.4 kg (175 lb)   SpO2 95%   BMI 31.00 kg/m²     Body mass index is 31 kg/m².         Review of Systems  PHQ-9 Depression Screening  Little interest or pleasure in doing things? 0-->not at all   Feeling down, depressed, or hopeless? 0-->not at all   Trouble falling or staying asleep, or sleeping too much?     Feeling tired or having little energy?     Poor appetite or overeating?     Feeling bad about yourself - or that you are a failure or have let yourself or your family down?     Trouble concentrating on things, such as reading the newspaper or watching television?     Moving or speaking so slowly that other people could have noticed? Or the " opposite - being so fidgety or restless that you have been moving around a lot more than usual?     Thoughts that you would be better off dead, or of hurting yourself in some way?     PHQ-9 Total Score 0   If you checked off any problems, how difficult have these problems made it for you to do your work, take care of things at home, or get along with other people?     LUIS MANUEL-7      Over the last two weeks, how often have you been bothered by the following problems?  Feeling nervous, anxious or on edge: Several days  Not being able to stop or control worrying: Not at all  Worrying too much about different things: Several days  Trouble Relaxing: Nearly every day  Being so restless that it is hard to sit still: Nearly every day  Becoming easily annoyed or irritable: Several days  Feeling afraid as if something awful might happen: Not at all  LUIS MANUEL 7 Total Score: 9  If you checked any problems, how difficult have these problems made it for you to do your work, take care of things at home, or get along with other people: Somewhat difficult    Current Outpatient Medications:   •  amLODIPine (NORVASC) 10 MG tablet, TAKE 1 TABLET BY MOUTH DAILY, Disp: 90 tablet, Rfl: 0  •  busPIRone (BUSPAR) 5 MG tablet, Take 1 tablet by mouth Daily As Needed (for anxiety)., Disp: 30 tablet, Rfl: 1  •  cyclobenzaprine (FLEXERIL) 5 MG tablet, Take 1 tablet by mouth 3 (Three) Times a Day., Disp: , Rfl:   •  HYDROcodone-acetaminophen (NORCO) 5-325 MG per tablet, Take 1 tablet by mouth Every 6 (Six) Hours As Needed for Moderate Pain., Disp: 15 tablet, Rfl: 0  •  levothyroxine (SYNTHROID, LEVOTHROID) 125 MCG tablet, Take 1 tablet by mouth Daily., Disp: 90 tablet, Rfl: 1  •  omeprazole (priLOSEC) 40 MG capsule, Take 1 capsule by mouth Daily., Disp: 90 capsule, Rfl: 2  •  sertraline (ZOLOFT) 100 MG tablet, Take 1 tablet by mouth Daily., Disp: 90 tablet, Rfl: 0  •  sertraline (ZOLOFT) 25 MG tablet, Take 1 tablet by mouth Daily., Disp: 90 tablet, Rfl:  0  •  traZODone (DESYREL) 50 MG tablet, TAKE 1 TO 2 TABLETS BY MOUTH AT NIGHT AS NEEDED FOR SLEEP, Disp: 180 tablet, Rfl: 1  •  triamcinolone (KENALOG) 0.1 % cream, Apply 1 application topically to the appropriate area as directed 2 (Two) Times a Day As Needed for Rash., Disp: 45 g, Rfl: 0  •  triamterene-hydrochlorothiazide (MAXZIDE-25) 37.5-25 MG per tablet, TAKE 1 TABLET BY MOUTH DAILY, Disp: 90 tablet, Rfl: 1    Allergies: Patient has no known allergies.    Physical Exam  Vitals and nursing note reviewed.   Constitutional:       General: She is not in acute distress.     Appearance: Normal appearance. She is not ill-appearing, toxic-appearing or diaphoretic.   HENT:      Head: Normocephalic and atraumatic.   Cardiovascular:      Rate and Rhythm: Normal rate and regular rhythm.      Heart sounds: Normal heart sounds.   Pulmonary:      Effort: Pulmonary effort is normal.      Breath sounds: Normal breath sounds.   Musculoskeletal:        Legs:    Neurological:      General: No focal deficit present.      Mental Status: She is alert.   Psychiatric:         Mood and Affect: Mood normal.         Behavior: Behavior normal.      Physical Exam      Result Review :          Results               Assessment and Plan    Diagnoses and all orders for this visit:    1. Generalized anxiety disorder (Primary)    2. Recurrent major depressive episodes, mild  Continue current regimen      3. Arthralgia of both ankles  -     Uric acid  -     Antistreptolysin O screen  -     Rheumatoid Factor  -     C-reactive protein  -     JAYLENE  -     CBC Auto Differential  -     Comprehensive Metabolic Panel    Will obtain xray today in clinic and to keep ibuprofen on hand to use as needed    4. Other headache syndrome  -     CT Head Without Contrast; Future      Assessment & Plan        Follow Up   No follow-ups on file.  Patient was given instructions and counseling regarding her condition or for health maintenance advice. Please see specific  information pulled into the AVS if appropriate.     Patient or patient representative verbalized consent for the use of Ambient Listening during the visit with  DL Ibarra for chart documentation. 7/8/2024  13:42 EDT    DL Ibarra  07/08/2024

## 2024-07-09 LAB
ALBUMIN SERPL-MCNC: 4.3 G/DL (ref 3.9–4.9)
ALP SERPL-CCNC: 79 IU/L (ref 44–121)
ALT SERPL-CCNC: 11 IU/L (ref 0–32)
ANA SER QL: NEGATIVE
AST SERPL-CCNC: 18 IU/L (ref 0–40)
BASOPHILS # BLD AUTO: 0.1 X10E3/UL (ref 0–0.2)
BASOPHILS NFR BLD AUTO: 1 %
BILIRUB SERPL-MCNC: 0.3 MG/DL (ref 0–1.2)
BUN SERPL-MCNC: 17 MG/DL (ref 8–27)
BUN/CREAT SERPL: 16 (ref 12–28)
CALCIUM SERPL-MCNC: 9.5 MG/DL (ref 8.7–10.3)
CHLORIDE SERPL-SCNC: 100 MMOL/L (ref 96–106)
CO2 SERPL-SCNC: 27 MMOL/L (ref 20–29)
CREAT SERPL-MCNC: 1.07 MG/DL (ref 0.57–1)
CRP SERPL-MCNC: 2 MG/L (ref 0–10)
EGFRCR SERPLBLD CKD-EPI 2021: 58 ML/MIN/1.73
EOSINOPHIL # BLD AUTO: 0.1 X10E3/UL (ref 0–0.4)
EOSINOPHIL NFR BLD AUTO: 1 %
ERYTHROCYTE [DISTWIDTH] IN BLOOD BY AUTOMATED COUNT: 13.4 % (ref 11.7–15.4)
GLOBULIN SER CALC-MCNC: 2 G/DL (ref 1.5–4.5)
GLUCOSE SERPL-MCNC: 81 MG/DL (ref 70–99)
HCT VFR BLD AUTO: 41 % (ref 34–46.6)
HGB BLD-MCNC: 12.9 G/DL (ref 11.1–15.9)
IMM GRANULOCYTES # BLD AUTO: 0 X10E3/UL (ref 0–0.1)
IMM GRANULOCYTES NFR BLD AUTO: 0 %
LYMPHOCYTES # BLD AUTO: 1.3 X10E3/UL (ref 0.7–3.1)
LYMPHOCYTES NFR BLD AUTO: 26 %
MCH RBC QN AUTO: 28 PG (ref 26.6–33)
MCHC RBC AUTO-ENTMCNC: 31.5 G/DL (ref 31.5–35.7)
MCV RBC AUTO: 89 FL (ref 79–97)
MONOCYTES # BLD AUTO: 0.5 X10E3/UL (ref 0.1–0.9)
MONOCYTES NFR BLD AUTO: 10 %
NEUTROPHILS # BLD AUTO: 3.1 X10E3/UL (ref 1.4–7)
NEUTROPHILS NFR BLD AUTO: 62 %
PLATELET # BLD AUTO: 129 X10E3/UL (ref 150–450)
POTASSIUM SERPL-SCNC: 3.7 MMOL/L (ref 3.5–5.2)
PROT SERPL-MCNC: 6.3 G/DL (ref 6–8.5)
RBC # BLD AUTO: 4.6 X10E6/UL (ref 3.77–5.28)
RHEUMATOID FACT SERPL-ACNC: 10.8 IU/ML
SODIUM SERPL-SCNC: 140 MMOL/L (ref 134–144)
URATE SERPL-MCNC: 5.2 MG/DL (ref 3–7.2)
WBC # BLD AUTO: 5 X10E3/UL (ref 3.4–10.8)

## 2024-07-13 NOTE — PROGRESS NOTES
Please call the patient and let her know that she is negative for rheumatoid factor as well as an sonny and other autoimmune diseases. Her uric acid is very low as well ruling out gout. It looks as though you were a little dehydrated at your last visit- but if you were fasting for your labs that can explain that as well. You blood count is normal as well. How have you been feeling? Has the rash returned?

## 2024-07-31 ENCOUNTER — TELEPHONE (OUTPATIENT)
Dept: FAMILY MEDICINE CLINIC | Facility: CLINIC | Age: 64
End: 2024-07-31
Payer: MEDICARE

## 2024-07-31 RX ORDER — AMLODIPINE BESYLATE 10 MG/1
10 TABLET ORAL DAILY
Qty: 30 TABLET | Refills: 0 | Status: SHIPPED | OUTPATIENT
Start: 2024-07-31

## 2024-08-22 RX ORDER — TRIAMTERENE AND HYDROCHLOROTHIAZIDE 37.5; 25 MG/1; MG/1
1 TABLET ORAL DAILY
Qty: 90 TABLET | Refills: 1 | Status: SHIPPED | OUTPATIENT
Start: 2024-08-22

## 2024-08-28 RX ORDER — AMLODIPINE BESYLATE 10 MG/1
10 TABLET ORAL DAILY
Qty: 30 TABLET | Refills: 0 | Status: SHIPPED | OUTPATIENT
Start: 2024-08-28

## 2024-09-13 ENCOUNTER — OFFICE VISIT (OUTPATIENT)
Dept: FAMILY MEDICINE CLINIC | Facility: CLINIC | Age: 64
End: 2024-09-13
Payer: MEDICARE

## 2024-09-13 VITALS
OXYGEN SATURATION: 99 % | WEIGHT: 175.25 LBS | HEART RATE: 68 BPM | DIASTOLIC BLOOD PRESSURE: 82 MMHG | HEIGHT: 63 IN | BODY MASS INDEX: 31.05 KG/M2 | SYSTOLIC BLOOD PRESSURE: 136 MMHG

## 2024-09-13 DIAGNOSIS — R82.90 NONSPECIFIC FINDING ON EXAMINATION OF URINE: ICD-10-CM

## 2024-09-13 DIAGNOSIS — M25.50 ARTHRALGIA, UNSPECIFIED JOINT: ICD-10-CM

## 2024-09-13 DIAGNOSIS — E78.2 MIXED HYPERLIPIDEMIA: ICD-10-CM

## 2024-09-13 DIAGNOSIS — R26.89 BALANCE DISORDER: ICD-10-CM

## 2024-09-13 DIAGNOSIS — F33.0 RECURRENT MAJOR DEPRESSIVE EPISODES, MILD: ICD-10-CM

## 2024-09-13 DIAGNOSIS — Z59.9 FINANCIAL PROBLEMS: ICD-10-CM

## 2024-09-13 DIAGNOSIS — R21 RASH: ICD-10-CM

## 2024-09-13 DIAGNOSIS — F51.01 PRIMARY INSOMNIA: ICD-10-CM

## 2024-09-13 DIAGNOSIS — D69.6 THROMBOCYTOPENIA: ICD-10-CM

## 2024-09-13 DIAGNOSIS — R20.2 PARESTHESIA: ICD-10-CM

## 2024-09-13 DIAGNOSIS — Z00.00 ENCOUNTER FOR SUBSEQUENT ANNUAL WELLNESS VISIT (AWV) IN MEDICARE PATIENT: Primary | ICD-10-CM

## 2024-09-13 DIAGNOSIS — Z71.89 ADVANCED DIRECTIVES, COUNSELING/DISCUSSION: ICD-10-CM

## 2024-09-13 DIAGNOSIS — E55.9 VITAMIN D DEFICIENCY: ICD-10-CM

## 2024-09-13 DIAGNOSIS — M15.9 PRIMARY OSTEOARTHRITIS INVOLVING MULTIPLE JOINTS: ICD-10-CM

## 2024-09-13 DIAGNOSIS — I10 BENIGN HYPERTENSION: ICD-10-CM

## 2024-09-13 DIAGNOSIS — J30.2 SEASONAL ALLERGIC RHINITIS, UNSPECIFIED TRIGGER: ICD-10-CM

## 2024-09-13 DIAGNOSIS — E03.9 ACQUIRED HYPOTHYROIDISM: ICD-10-CM

## 2024-09-13 DIAGNOSIS — M50.20 CERVICAL DISC HERNIATION: ICD-10-CM

## 2024-09-13 PROBLEM — R10.10 UPPER ABDOMINAL PAIN: Status: RESOLVED | Noted: 2023-05-24 | Resolved: 2024-09-13

## 2024-09-13 PROBLEM — Z78.9 PATIENT HAD NO FALLS IN PAST YEAR: Status: RESOLVED | Noted: 2022-09-20 | Resolved: 2024-09-13

## 2024-09-13 PROBLEM — H92.01 RIGHT EAR PAIN: Status: RESOLVED | Noted: 2022-06-24 | Resolved: 2024-09-13

## 2024-09-13 PROBLEM — R60.9 EDEMA: Status: RESOLVED | Noted: 2022-06-24 | Resolved: 2024-09-13

## 2024-09-13 PROBLEM — H93.8X3 EAR FULLNESS, BILATERAL: Status: RESOLVED | Noted: 2022-05-11 | Resolved: 2024-09-13

## 2024-09-13 PROBLEM — J01.00 ACUTE MAXILLARY SINUSITIS: Status: RESOLVED | Noted: 2023-08-30 | Resolved: 2024-09-13

## 2024-09-13 PROBLEM — M79.671 PAIN OF RIGHT HEEL: Status: RESOLVED | Noted: 2022-09-20 | Resolved: 2024-09-13

## 2024-09-13 LAB
BILIRUB BLD-MCNC: NEGATIVE MG/DL
CLARITY, POC: CLEAR
COLOR UR: YELLOW
EXPIRATION DATE: ABNORMAL
GLUCOSE UR STRIP-MCNC: NEGATIVE MG/DL
KETONES UR QL: NEGATIVE
LEUKOCYTE EST, POC: NEGATIVE
Lab: ABNORMAL
NITRITE UR-MCNC: NEGATIVE MG/ML
PH UR: 7 [PH] (ref 5–8)
PROT UR STRIP-MCNC: NEGATIVE MG/DL
RBC # UR STRIP: ABNORMAL /UL
SP GR UR: 1.01 (ref 1–1.03)
UROBILINOGEN UR QL: NORMAL

## 2024-09-13 SDOH — ECONOMIC STABILITY - INCOME SECURITY: PROBLEM RELATED TO HOUSING AND ECONOMIC CIRCUMSTANCES, UNSPECIFIED: Z59.9

## 2024-09-13 NOTE — ASSESSMENT & PLAN NOTE
Labs drawn.  Will place neurology referral for further evaluation.  Stay hydrated with water.  Education provided.  Informed her that this possibly could be related to her neck and low back issues.  Return to clinic or ED with any issues or concerns.

## 2024-09-13 NOTE — PROGRESS NOTES
The ABCs of the Annual Wellness Visit  Subsequent Medicare Wellness Visit    Subjective      Nat Candelaria is a 63 y.o. female who presents for a Subsequent Medicare Wellness Visit.    Presents for annual exam.  Quit smoking in 1992.  No alcohol use.  Doing well on current medications with no issues or side effects.  Tries to watch her diet she does stay active.  No dizziness no headache no chest pain no chest pressure no shortness of breath no trouble breathing no urinary or bowel issues.    Mammogram up-to-date.  Denies Pap smears.  Colonoscopy up-to-date.  Tetanus vaccine up-to-date.    Continues to follow-up with her endocrinologist regarding her hypothyroidism.    States for the past 2+ years she has had recurrent rash present on her lower legs near her ankles.  States she currently has it present for the past day to her left lower leg.  States in the past no steroids or creams or anything help.  States that randomly comes and then will randomly go away in a couple of days.  She states she has seen dermatology in the past and they informed her that the next time she has a flareup she needs to come into their office so that they can take a sample of it to send off.  She states she will contact them today to get in with them for them to evaluate the rash further.    She also states over the past 6+ months she has had worsening memory.  States she seems to forget things more often.  No family history of dementia or Alzheimer's.  She also states she has noticed she will randomly have episodes where she will lose her balance and almost fall.  She also states she has noticed random episodes of a bee sting type sensation that can occur randomly on her legs arms or back.  No patterns.  States it comes and goes.  She is unsure what is causing this.  She has had 3 neck surgeries in the past and does have chronic low back pain so is unsure if it is just related to that.  She states she has been doing some research and  states she does have some symptoms of multiple sclerosis so she would like a referral to neurologist to evaluate this further.    She also states her  is currently esophageal cancer.  She states money is tight.  She states they have been able to do some type of program through [employer] for her  that helps pay for office visits and medical supply of she is wondering if there is anything that could help her out as well.  I will place a social work referral for this and see if there is anything they can do to help out.    The following portions of the patient's history were reviewed and   updated as appropriate: allergies, current medications, past family history, past medical history, past social history, past surgical history, and problem list.    Compared to one year ago, the patient feels her physical   health is the same.    Compared to one year ago, the patient feels her mental   health is the same.    Recent Hospitalizations:  She was not admitted to the hospital during the last year.       Current Medical Providers:  Patient Care Team:  Jesus Chang APRN as PCP - General (Nurse Practitioner)  Rosenstein, Kyle S, MD as Consulting Physician (Endocrinology)    Outpatient Medications Prior to Visit   Medication Sig Dispense Refill    amLODIPine (NORVASC) 10 MG tablet TAKE 1 TABLET BY MOUTH DAILY 30 tablet 0    busPIRone (BUSPAR) 5 MG tablet Take 1 tablet by mouth Daily As Needed (for anxiety). 30 tablet 1    cyclobenzaprine (FLEXERIL) 5 MG tablet Take 1 tablet by mouth 3 (Three) Times a Day.      HYDROcodone-acetaminophen (NORCO) 5-325 MG per tablet Take 1 tablet by mouth Every 6 (Six) Hours As Needed for Moderate Pain. 15 tablet 0    levothyroxine (SYNTHROID, LEVOTHROID) 125 MCG tablet Take 1 tablet by mouth Daily. 90 tablet 1    omeprazole (priLOSEC) 40 MG capsule Take 1 capsule by mouth Daily. 90 capsule 2    sertraline (ZOLOFT) 100 MG tablet Take 1 tablet by mouth Daily. 90 tablet 0    traZODone  "(DESYREL) 50 MG tablet TAKE 1 TO 2 TABLETS BY MOUTH AT NIGHT AS NEEDED FOR SLEEP 180 tablet 1    triamcinolone (KENALOG) 0.1 % cream Apply 1 application topically to the appropriate area as directed 2 (Two) Times a Day As Needed for Rash. 45 g 0    triamterene-hydrochlorothiazide (MAXZIDE-25) 37.5-25 MG per tablet TAKE 1 TABLET BY MOUTH DAILY 90 tablet 1    sertraline (ZOLOFT) 25 MG tablet Take 1 tablet by mouth Daily. (Patient not taking: Reported on 9/13/2024) 90 tablet 0     No facility-administered medications prior to visit.       Opioid medication/s are on active medication list.  and I have evaluated her active treatment plan and pain score trends (see table).  There were no vitals filed for this visit.  I have reviewed the chart for potential of high risk medication and harmful drug interactions in the elderly.          Aspirin is not on active medication list.  Aspirin use is not indicated based on review of current medical condition/s. Risk of harm outweighs potential benefits.  .    Patient Active Problem List   Diagnosis    Acquired hypothyroidism    Arthralgia    Encounter for subsequent annual wellness visit (AWV) in Medicare patient    Benign hypertension    Mixed hyperlipidemia    Primary insomnia    Recurrent major depressive episodes, mild    Primary osteoarthritis involving multiple joints    Rash    Cervical disc herniation    Thrombocytopenia    Allergic rhinitis due to allergen    Advanced directives, counseling/discussion    Paresthesia    Balance disorder     Advance Care Planning  Advance Directive is not on file.  ACP discussion was held with the patient during this visit. Patient has an advance directive (not in EMR), copy requested.     Objective    Vitals:    09/13/24 1327   BP: 136/82   Pulse: 68   SpO2: 99%   Weight: 79.5 kg (175 lb 4 oz)   Height: 160 cm (63\")     Estimated body mass index is 31.04 kg/m² as calculated from the following:    Height as of this encounter: 160 cm (63\").   "  Weight as of this encounter: 79.5 kg (175 lb 4 oz).           Does the patient have evidence of cognitive impairment?   No            Review of Systems   Constitutional: Negative.    HENT: Negative.     Eyes: Negative.    Respiratory: Negative.     Cardiovascular: Negative.    Gastrointestinal: Negative.    Endocrine: Negative for polydipsia, polyphagia and polyuria.   Genitourinary: Negative.    Musculoskeletal:  Positive for arthralgias, back pain, gait problem and neck pain. Negative for joint swelling.   Skin:  Positive for rash. Negative for skin lesions and wound.   Neurological:  Positive for memory problem. Negative for dizziness, tremors, seizures, syncope, facial asymmetry, speech difficulty, weakness, light-headedness, headache and confusion.        Paresthesias    Psychiatric/Behavioral: Negative.          Physical Exam  Constitutional:       Appearance: Normal appearance.   HENT:      Head: Normocephalic.      Right Ear: Tympanic membrane, ear canal and external ear normal.      Left Ear: Tympanic membrane, ear canal and external ear normal.      Nose: Nose normal.      Mouth/Throat:      Mouth: Mucous membranes are moist.      Pharynx: Oropharynx is clear.   Eyes:      Extraocular Movements: Extraocular movements intact.      Conjunctiva/sclera: Conjunctivae normal.      Pupils: Pupils are equal, round, and reactive to light.   Cardiovascular:      Rate and Rhythm: Normal rate and regular rhythm.      Heart sounds: Normal heart sounds.   Pulmonary:      Effort: Pulmonary effort is normal.      Breath sounds: Normal breath sounds.   Abdominal:      General: Abdomen is flat. Bowel sounds are normal.      Palpations: Abdomen is soft.   Genitourinary:     Comments: Denied   Musculoskeletal:         General: Normal range of motion.      Cervical back: Normal range of motion.   Skin:     General: Skin is warm.      Findings: Rash present.             Comments: Erythematous flat rash type area present to  left lower medial leg as marked above. Non-tender. No warmth. No itching. No open skin.    Neurological:      General: No focal deficit present.      Mental Status: She is alert and oriented to person, place, and time. Mental status is at baseline.   Psychiatric:         Mood and Affect: Mood normal.         Behavior: Behavior normal.         Thought Content: Thought content normal.         Judgment: Judgment normal.          HEALTH RISK ASSESSMENT    Smoking Status:  Social History     Tobacco Use   Smoking Status Former    Current packs/day: 0.00    Average packs/day: 1 pack/day for 20.0 years (20.0 ttl pk-yrs)    Types: Cigarettes    Start date: 1974    Quit date: 1992    Years since quittin.7   Smokeless Tobacco Never     Alcohol Consumption:  Social History     Substance and Sexual Activity   Alcohol Use Not Currently    Comment: rarely     Fall Risk Screen:    FARHAT Fall Risk Assessment was completed, and patient is at LOW risk for falls.Assessment completed on:2024    Depression Screenin/13/2024     1:30 PM   PHQ-2/PHQ-9 Depression Screening   Little Interest or Pleasure in Doing Things 0-->not at all   Feeling Down, Depressed or Hopeless 1-->several days   PHQ-9: Brief Depression Severity Measure Score 1       Health Habits and Functional and Cognitive Screenin/13/2024     1:00 PM   Functional & Cognitive Status   Do you have difficulty preparing food and eating? No   Do you have difficulty bathing yourself, getting dressed or grooming yourself? No   Do you have difficulty using the toilet? No   Do you have difficulty moving around from place to place? No   Do you have trouble with steps or getting out of a bed or a chair? No   Current Diet Well Balanced Diet   Dental Exam Up to date   Eye Exam Not up to date   Exercise (times per week) 7 times per week   Current Exercises Include Walking   Do you need help using the phone?  No   Are you deaf or do you have serious  difficulty hearing?  No   Do you need help to go to places out of walking distance? No   Do you need help shopping? No   Do you need help preparing meals?  No   Do you need help with housework?  No   Do you need help with laundry? No   Do you need help taking your medications? No   Do you need help managing money? No   Do you ever drive or ride in a car without wearing a seat belt? No   Have you felt unusual stress, anger or loneliness in the last month? No   Who do you live with? Spouse   If you need help, do you have trouble finding someone available to you? No   Have you been bothered in the last four weeks by sexual problems? No   Do you have difficulty concentrating, remembering or making decisions? Yes       Age-appropriate Screening Schedule:  Refer to the list below for future screening recommendations based on patient's age, sex and/or medical conditions. Orders for these recommended tests are listed in the plan section. The patient has been provided with a written plan.    Health Maintenance   Topic Date Due    LIPID PANEL  02/09/2024    ZOSTER VACCINE (1 of 2) 09/13/2024 (Originally 9/24/2009)    COVID-19 Vaccine (2 - 2023-24 season) 09/15/2024 (Originally 9/1/2024)    INFLUENZA VACCINE  03/31/2025 (Originally 8/1/2024)    BMI FOLLOWUP  06/21/2025    ANNUAL WELLNESS VISIT  09/13/2025    MAMMOGRAM  06/07/2026    COLORECTAL CANCER SCREENING  08/17/2026    TDAP/TD VACCINES (2 - Td or Tdap) 03/26/2028    HEPATITIS C SCREENING  Completed    Pneumococcal Vaccine 0-64  Aged Out                CMS Preventative Services Quick Reference  Risk Factors Identified During Encounter:    Immunizations Discussed/Encouraged: Tdap, Hepatitis A Vaccine/Series, Hepatitis B Vaccine/Series, Influenza, Pneumococcal 23, Prevnar 20 (Pneumococcal 20-valent conjugate), Vaxneuvance (Pneumococcal 15-valent conjugate), Shingrix, COVID19, and RSV (Respiratory Syncytial Virus)  Dental Screening Recommended  Vision Screening  Recommended    The above risks/problems have been discussed with the patient.  Pertinent information has been shared with the patient in the After Visit Summary.    Diagnoses and all orders for this visit:    1. Encounter for subsequent annual wellness visit (AWV) in Medicare patient (Primary)  Assessment & Plan:  Labs drawn.  UA completed.  Culture sent.  Call in 2 days for results.  Return in 1 to 2 weeks for repeat UA to make sure clears.  Goal blood pressure less than 140/90.  Let me know if gets to or above that.  PHQ-9 completed and discussed.  No thoughts of suicide or hurting herself or anyone else.  Proper diet and exercise plan discussed and encouraged.  Annual dental and eye exams encouraged.  Continue to follow-up with endocrinology.  She denies Pap smears stating she has had a total hysterectomy.  Colonoscopy up-to-date and she knows she will be due a repeat in 2026.  Mammogram up-to-date.  Tetanus vaccine up-to-date.  She states she plans on doing a pneumonia vaccine when she turns 64.  States she plans on discussing shingles vaccine further with her pharmacist.  Denies flu and COVID vaccines.  No cognitive impairment.  No hearing impairment.  Does not need help with ADLs.  Risk of meds discussed and understood.  Education provided.  Return to clinic or ED with any issues or concerns.    Orders:  -     CBC & Differential  -     Comprehensive Metabolic Panel  -     TSH Rfx On Abnormal To Free T4  -     POC Urinalysis Dipstick, Automated; Future  -     POC Urinalysis Dipstick, Automated    2. Advanced directives, counseling/discussion    3. Seasonal allergic rhinitis, unspecified trigger    4. Mixed hyperlipidemia  -     Lipid Panel    5. Benign hypertension    6. Thrombocytopenia    7. Acquired hypothyroidism  Assessment & Plan:  Continue to follow-up with endocrinologist as scheduled.  Continue current meds.      8. Recurrent major depressive episodes, mild    9. Primary osteoarthritis involving  multiple joints    10. Arthralgia, unspecified joint    11. Cervical disc herniation    12. Rash  Assessment & Plan:  She will get back in with her dermatologist ASAP to evaluate this further.  She states last time her dermatologist told her that when she has a rash she needs to get back in there so that they can take a sample of it to send it off to find out exactly what it is.  She knows to go to the emergency department if worsens.  She will discuss further with her dermatologist.  Encouraged to contact today.      13. Primary insomnia    14. Balance disorder  Assessment & Plan:  Labs drawn.  UA completed.  Stay hydrated with water.  Neurology referral placed for further evaluation.  Education provided.  Go to ED if worsens.  Return to clinic or ED with any issues or concerns.    Orders:  -     Ambulatory Referral to Neurology    15. Paresthesia  Assessment & Plan:  Labs drawn.  Will place neurology referral for further evaluation.  Stay hydrated with water.  Education provided.  Informed her that this possibly could be related to her neck and low back issues.  Return to clinic or ED with any issues or concerns.    Orders:  -     CBC & Differential  -     Comprehensive Metabolic Panel  -     Vitamin B12  -     Magnesium  -     Ambulatory Referral to Neurology    16. Vitamin D deficiency  -     Vitamin D,25-Hydroxy    17. Financial problems  -     Ambulatory Referral to Social Care Services (Amb Case Mgmt)    18. Nonspecific finding on examination of urine  -     Urine Culture - Urine, Urine, Clean Catch        Follow Up:   Next Medicare Wellness visit to be scheduled in 1 year.      An After Visit Summary and PPPS were made available to the patient.    Return in about 6 months (around 3/13/2025), or if symptoms worsen or fail to improve.

## 2024-09-13 NOTE — ASSESSMENT & PLAN NOTE
She will get back in with her dermatologist ASAP to evaluate this further.  She states last time her dermatologist told her that when she has a rash she needs to get back in there so that they can take a sample of it to send it off to find out exactly what it is.  She knows to go to the emergency department if worsens.  She will discuss further with her dermatologist.  Encouraged to contact today.

## 2024-09-13 NOTE — ASSESSMENT & PLAN NOTE
Labs drawn.  UA completed.  Culture sent.  Call in 2 days for results.  Return in 1 to 2 weeks for repeat UA to make sure clears.  Goal blood pressure less than 140/90.  Let me know if gets to or above that.  PHQ-9 completed and discussed.  No thoughts of suicide or hurting herself or anyone else.  Proper diet and exercise plan discussed and encouraged.  Annual dental and eye exams encouraged.  Continue to follow-up with endocrinology.  She denies Pap smears stating she has had a total hysterectomy.  Colonoscopy up-to-date and she knows she will be due a repeat in 2026.  Mammogram up-to-date.  Tetanus vaccine up-to-date.  She states she plans on doing a pneumonia vaccine when she turns 64.  States she plans on discussing shingles vaccine further with her pharmacist.  Denies flu and COVID vaccines.  No cognitive impairment.  No hearing impairment.  Does not need help with ADLs.  Risk of meds discussed and understood.  Education provided.  Return to clinic or ED with any issues or concerns.

## 2024-09-13 NOTE — ASSESSMENT & PLAN NOTE
Labs drawn.  UA completed.  Stay hydrated with water.  Neurology referral placed for further evaluation.  Education provided.  Go to ED if worsens.  Return to clinic or ED with any issues or concerns.

## 2024-09-14 LAB
25(OH)D3+25(OH)D2 SERPL-MCNC: 28 NG/ML (ref 30–100)
ALBUMIN SERPL-MCNC: 4.6 G/DL (ref 3.9–4.9)
ALP SERPL-CCNC: 90 IU/L (ref 44–121)
ALT SERPL-CCNC: 14 IU/L (ref 0–32)
AST SERPL-CCNC: 19 IU/L (ref 0–40)
BASOPHILS # BLD AUTO: 0.1 X10E3/UL (ref 0–0.2)
BASOPHILS NFR BLD AUTO: 1 %
BILIRUB SERPL-MCNC: 0.3 MG/DL (ref 0–1.2)
BUN SERPL-MCNC: 14 MG/DL (ref 8–27)
BUN/CREAT SERPL: 16 (ref 12–28)
CALCIUM SERPL-MCNC: 9.6 MG/DL (ref 8.7–10.3)
CHLORIDE SERPL-SCNC: 95 MMOL/L (ref 96–106)
CHOLEST SERPL-MCNC: 275 MG/DL (ref 100–199)
CO2 SERPL-SCNC: 27 MMOL/L (ref 20–29)
CREAT SERPL-MCNC: 0.86 MG/DL (ref 0.57–1)
EGFRCR SERPLBLD CKD-EPI 2021: 75 ML/MIN/1.73
EOSINOPHIL # BLD AUTO: 0.1 X10E3/UL (ref 0–0.4)
EOSINOPHIL NFR BLD AUTO: 1 %
ERYTHROCYTE [DISTWIDTH] IN BLOOD BY AUTOMATED COUNT: 13.1 % (ref 11.7–15.4)
GLOBULIN SER CALC-MCNC: 2 G/DL (ref 1.5–4.5)
GLUCOSE SERPL-MCNC: 80 MG/DL (ref 70–99)
HCT VFR BLD AUTO: 42.1 % (ref 34–46.6)
HDLC SERPL-MCNC: 82 MG/DL
HGB BLD-MCNC: 13.7 G/DL (ref 11.1–15.9)
IMM GRANULOCYTES # BLD AUTO: 0 X10E3/UL (ref 0–0.1)
IMM GRANULOCYTES NFR BLD AUTO: 0 %
LDLC SERPL CALC-MCNC: 177 MG/DL (ref 0–99)
LYMPHOCYTES # BLD AUTO: 1.4 X10E3/UL (ref 0.7–3.1)
LYMPHOCYTES NFR BLD AUTO: 26 %
MAGNESIUM SERPL-MCNC: 2 MG/DL (ref 1.6–2.3)
MCH RBC QN AUTO: 29.2 PG (ref 26.6–33)
MCHC RBC AUTO-ENTMCNC: 32.5 G/DL (ref 31.5–35.7)
MCV RBC AUTO: 90 FL (ref 79–97)
MONOCYTES # BLD AUTO: 0.5 X10E3/UL (ref 0.1–0.9)
MONOCYTES NFR BLD AUTO: 9 %
NEUTROPHILS # BLD AUTO: 3.5 X10E3/UL (ref 1.4–7)
NEUTROPHILS NFR BLD AUTO: 63 %
PLATELET # BLD AUTO: 136 X10E3/UL (ref 150–450)
POTASSIUM SERPL-SCNC: 4.1 MMOL/L (ref 3.5–5.2)
PROT SERPL-MCNC: 6.6 G/DL (ref 6–8.5)
RBC # BLD AUTO: 4.69 X10E6/UL (ref 3.77–5.28)
SODIUM SERPL-SCNC: 135 MMOL/L (ref 134–144)
T4 FREE SERPL-MCNC: 1.66 NG/DL (ref 0.82–1.77)
TRIGL SERPL-MCNC: 97 MG/DL (ref 0–149)
TSH SERPL DL<=0.005 MIU/L-ACNC: 0.38 UIU/ML (ref 0.45–4.5)
VIT B12 SERPL-MCNC: 580 PG/ML (ref 232–1245)
VLDLC SERPL CALC-MCNC: 16 MG/DL (ref 5–40)
WBC # BLD AUTO: 5.5 X10E3/UL (ref 3.4–10.8)

## 2024-09-15 LAB
BACTERIA UR CULT: NORMAL
BACTERIA UR CULT: NORMAL

## 2024-09-16 ENCOUNTER — REFERRAL TRIAGE (OUTPATIENT)
Age: 64
End: 2024-09-16
Payer: MEDICARE

## 2024-09-18 ENCOUNTER — PATIENT OUTREACH (OUTPATIENT)
Age: 64
End: 2024-09-18
Payer: MEDICARE

## 2024-09-25 RX ORDER — AMLODIPINE BESYLATE 10 MG/1
10 TABLET ORAL DAILY
Qty: 30 TABLET | Refills: 5 | Status: SHIPPED | OUTPATIENT
Start: 2024-09-25

## 2024-10-04 ENCOUNTER — TELEPHONE (OUTPATIENT)
Dept: FAMILY MEDICINE CLINIC | Facility: CLINIC | Age: 64
End: 2024-10-04
Payer: MEDICARE

## 2024-10-04 ENCOUNTER — OFFICE VISIT (OUTPATIENT)
Dept: FAMILY MEDICINE CLINIC | Facility: CLINIC | Age: 64
End: 2024-10-04
Payer: MEDICARE

## 2024-10-04 VITALS
WEIGHT: 176 LBS | HEART RATE: 75 BPM | BODY MASS INDEX: 31.18 KG/M2 | HEIGHT: 63 IN | OXYGEN SATURATION: 97 % | DIASTOLIC BLOOD PRESSURE: 78 MMHG | SYSTOLIC BLOOD PRESSURE: 130 MMHG

## 2024-10-04 DIAGNOSIS — R82.90 ABNORMAL URINE FINDINGS: ICD-10-CM

## 2024-10-04 DIAGNOSIS — R10.9 STOMACH PAIN: Primary | ICD-10-CM

## 2024-10-04 DIAGNOSIS — R10.9 SIDE PAIN: ICD-10-CM

## 2024-10-04 LAB
BILIRUB BLD-MCNC: ABNORMAL MG/DL
CLARITY, POC: CLEAR
COLOR UR: YELLOW
EXPIRATION DATE: ABNORMAL
GLUCOSE UR STRIP-MCNC: NEGATIVE MG/DL
KETONES UR QL: NEGATIVE
LEUKOCYTE EST, POC: NEGATIVE
Lab: ABNORMAL
NITRITE UR-MCNC: NEGATIVE MG/ML
PH UR: 6.5 [PH] (ref 5–8)
PROT UR STRIP-MCNC: ABNORMAL MG/DL
RBC # UR STRIP: ABNORMAL /UL
SP GR UR: 1.02 (ref 1–1.03)
UROBILINOGEN UR QL: ABNORMAL

## 2024-10-04 PROCEDURE — 1159F MED LIST DOCD IN RCRD: CPT | Performed by: FAMILY MEDICINE

## 2024-10-04 PROCEDURE — 99214 OFFICE O/P EST MOD 30 MIN: CPT | Performed by: FAMILY MEDICINE

## 2024-10-04 PROCEDURE — 1125F AMNT PAIN NOTED PAIN PRSNT: CPT | Performed by: FAMILY MEDICINE

## 2024-10-04 PROCEDURE — 3075F SYST BP GE 130 - 139MM HG: CPT | Performed by: FAMILY MEDICINE

## 2024-10-04 PROCEDURE — 1160F RVW MEDS BY RX/DR IN RCRD: CPT | Performed by: FAMILY MEDICINE

## 2024-10-04 PROCEDURE — 81003 URINALYSIS AUTO W/O SCOPE: CPT | Performed by: FAMILY MEDICINE

## 2024-10-04 PROCEDURE — 3078F DIAST BP <80 MM HG: CPT | Performed by: FAMILY MEDICINE

## 2024-10-04 RX ORDER — PREDNISONE 20 MG/1
20 TABLET ORAL
Qty: 20 TABLET | Refills: 0 | Status: SHIPPED | OUTPATIENT
Start: 2024-10-04

## 2024-10-04 NOTE — TELEPHONE ENCOUNTER
Fred called needing a frequency for the patient's tizanidine so they can it through insurance please advise

## 2024-10-04 NOTE — PROGRESS NOTES
Follow Up Office Visit      Date of Visit:  10/04/2024   Patient Name: Nat Candelaria  : 1960   MRN: [id number]     Chief Complaint:    Chief Complaint   Patient presents with    Abdominal Pain     Right side  X1 week       History of Present Illness: Nat Candelaria is a 64 y.o. female who is here today for follow up.    History of Present Illness  The patient presents for evaluation of side pain.    She reports experiencing pain in her side, which has been progressively worsening since the beginning of the week. The pain intensifies when she bends over or applies pressure to the area below her ribs. She recalls an incident earlier in the week where she bent over in her car to reach for something on the other seat, and upon contact with the console, she experienced a sharp pain. Since then, the pain has intensified. She also mentions feeling generally unwell, with discomfort in her stomach, but her bowel movements remain normal. The pain is severe enough to cause distress even without movement, but she is able to sleep as long as she lies on the opposite side. She has not tried using ice packs or heat for relief.     Three weeks ago, she was here for her physical examination, during which a urine test indicated blood in her urine, but the sample was contaminated. She was not experiencing any symptoms at that time. Two days later, she visited urgent care with a severe urinary tract infection, experiencing burning during urination.      Subjective      Review of Systems:   Review of Systems    Past Medical History:   Past Medical History:   Diagnosis Date    Acquired hypothyroidism     Allergic     Benign hypertension     Clotting disorder     ITP    Colon polyp     Degenerative joint disease involving multiple joints     Diverticulosis     Hashimoto's thyroiditis     Headache 2007    3 neck surgeries    HL (hearing loss)     Kidney stone     Low back pain 2006    Mixed hyperlipidemia      Obesity     CLASS 1 OBESITY DUE TO EXCDSS CALORIES WITH SERIOUS COMORBIDITY AND BODY MASS INDEX(BMI) OF 31.0 TO 31.9 IN ADULT    Primary insomnia     Primary osteoarthritis involving multiple joints     Recurrent major depressive episodes, mild     Urinary tract infection        Past Surgical History:   Past Surgical History:   Procedure Laterality Date    APPENDECTOMY      BREAST BIOPSY Right 2009    BREAST CYST EXCISION Right 2009    CHOLECYSTECTOMY      COLONOSCOPY  2000    HYSTERECTOMY      SPINE SURGERY      X3    TOTAL ABDOMINAL HYSTERECTOMY WITH SALPINGO OOPHORECTOMY      TUBAL ABDOMINAL LIGATION         Family History:   Family History   Problem Relation Age of Onset    Coronary artery disease Mother     Breast cancer Mother     Lung cancer Mother     Colon polyps Mother     Cancer Mother         Breast lung    Arthritis Mother     Heart disease Mother     Hyperlipidemia Mother     Coronary artery disease Father     Hypertension Father     Atrial fibrillation Sister     Thyroid disease Brother        Social History:   Social History     Socioeconomic History    Marital status:     Number of children: 1    Highest education level: 12th grade   Tobacco Use    Smoking status: Former     Current packs/day: 0.00     Average packs/day: 1 pack/day for 20.0 years (20.0 ttl pk-yrs)     Types: Cigarettes     Start date: 1974     Quit date: 1992     Years since quittin.7     Passive exposure: Past    Smokeless tobacco: Never   Vaping Use    Vaping status: Never Used   Substance and Sexual Activity    Alcohol use: Not Currently     Comment: rarely    Drug use: Never    Sexual activity: Not Currently     Partners: Male     Birth control/protection: Tubal ligation, Hysterectomy       Medications:     Current Outpatient Medications:     amLODIPine (NORVASC) 10 MG tablet, TAKE 1 TABLET BY MOUTH DAILY, Disp: 30 tablet, Rfl: 5    busPIRone (BUSPAR) 5 MG tablet, Take 1 tablet by mouth Daily As  "Needed (for anxiety)., Disp: 30 tablet, Rfl: 1    cyclobenzaprine (FLEXERIL) 5 MG tablet, Take 1 tablet by mouth 3 (Three) Times a Day., Disp: , Rfl:     HYDROcodone-acetaminophen (NORCO) 5-325 MG per tablet, Take 1 tablet by mouth Every 6 (Six) Hours As Needed for Moderate Pain., Disp: 15 tablet, Rfl: 0    levothyroxine (SYNTHROID, LEVOTHROID) 125 MCG tablet, Take 1 tablet by mouth Daily., Disp: 90 tablet, Rfl: 1    omeprazole (priLOSEC) 40 MG capsule, Take 1 capsule by mouth Daily., Disp: 90 capsule, Rfl: 2    sertraline (ZOLOFT) 100 MG tablet, Take 1 tablet by mouth Daily., Disp: 90 tablet, Rfl: 0    traZODone (DESYREL) 50 MG tablet, TAKE 1 TO 2 TABLETS BY MOUTH AT NIGHT AS NEEDED FOR SLEEP, Disp: 180 tablet, Rfl: 1    triamcinolone (KENALOG) 0.1 % cream, Apply 1 application topically to the appropriate area as directed 2 (Two) Times a Day As Needed for Rash., Disp: 45 g, Rfl: 0    triamterene-hydrochlorothiazide (MAXZIDE-25) 37.5-25 MG per tablet, TAKE 1 TABLET BY MOUTH DAILY, Disp: 90 tablet, Rfl: 1    tiZANidine (ZANAFLEX) 4 MG tablet, 0.5 to 1 po prn muscle relaxant, Disp: 30 tablet, Rfl: 1    Allergies:   No Known Allergies    Objective     Physical Exam:  Vital Signs:   Vitals:    10/04/24 1303   BP: 130/78   Pulse: 75   SpO2: 97%   Weight: 79.8 kg (176 lb)   Height: 160 cm (63\")   PainSc:   7   PainLoc: Abdomen  Comment: right side     Body mass index is 31.18 kg/m².     Physical Exam  Constitutional:       General: She is not in acute distress.     Appearance: Normal appearance. She is obese. She is not toxic-appearing or diaphoretic.   HENT:      Head: Normocephalic and atraumatic.      Right Ear: External ear normal.      Left Ear: External ear normal.      Nose: Nose normal.   Eyes:      Pupils: Pupils are equal, round, and reactive to light.   Chest:          Comments: Sore lakesha  Skin:     General: Skin is warm and dry.   Neurological:      Mental Status: She is alert.   Psychiatric:         Mood and " Affect: Mood normal.         Behavior: Behavior normal.     Physical Exam      Results  Laboratory Studies  Urine test showed trace blood, negative leukocytes, negative nitrites.    Procedures      Assessment / Plan      Assessment/Plan:   Diagnoses and all orders for this visit:    1. Stomach pain (Primary)  -     POC Urinalysis Dipstick, Automated  -     Urine Culture - Urine, Urine, Clean Catch    2. Abnormal urine findings  -     Urine Culture - Urine, Urine, Clean Catch    Other orders  -     tiZANidine (ZANAFLEX) 4 MG tablet; 0.5 to 1 po prn muscle relaxant  Dispense: 30 tablet; Refill: 1       Assessment & Plan  1. Side Pain.  The patient's side pain is likely due to a soft tissue injury, possibly a stretched ligament, rather than a bone fracture. There are no signs of infection. She was prescribed prednisone and advised to continue taking her muscle relaxant, tizanidine, at half or one tablet as needed. If the pain does not improve, an x-ray will be considered. She was also advised to use ice packs or heat for relief.    2. Urinary Tract Infection (UTI).  The patient reported a recent episode of a urinary tract infection with symptoms of burning and pain. Although the urine culture showed trace blood, it was negative for leukocytes and nitrites, indicating no current infection. She was advised to monitor her symptoms and return if they worsen.        Follow Up:   No follow-ups on file.    Patient or patient representative verbalized consent for the use of Ambient Listening during the visit with  Richard Alcantara MD for chart documentation. 10/4/2024  16:41 EDT     @[facility]

## 2024-10-04 NOTE — TELEPHONE ENCOUNTER
Per Dr. Alcantara is is q 8 hours as needed.     Called and spoke with [facility] pharmacy.

## 2024-10-06 LAB
BACTERIA UR CULT: NORMAL
BACTERIA UR CULT: NORMAL

## 2024-10-22 RX ORDER — TRAZODONE HYDROCHLORIDE 50 MG/1
50-100 TABLET, FILM COATED ORAL NIGHTLY PRN
Qty: 180 TABLET | Refills: 0 | Status: SHIPPED | OUTPATIENT
Start: 2024-10-22

## 2024-10-26 DIAGNOSIS — R10.11 RUQ PAIN: ICD-10-CM

## 2024-10-26 DIAGNOSIS — R68.81 EARLY SATIETY: ICD-10-CM

## 2024-10-28 RX ORDER — OMEPRAZOLE 40 MG/1
40 CAPSULE, DELAYED RELEASE ORAL DAILY
Qty: 90 CAPSULE | Refills: 2 | OUTPATIENT
Start: 2024-10-28

## 2024-10-28 NOTE — TELEPHONE ENCOUNTER
Rx Refill Note  Pending Prescriptions:                       Disp   Refills    omeprazole (priLOSEC) 40 MG capsule [Pharm*90 cap*2        Sig: Take 1 capsule by mouth Daily.    Last office visit with prescribing clinician: 6/6/2023   Last telemedicine visit with prescribing clinician: Visit date not found   Next office visit with prescribing clinician: Visit date not found         Ratna Chapa MA  10/28/24, 07:34 EDT

## 2024-11-08 RX ORDER — SERTRALINE HYDROCHLORIDE 100 MG/1
100 TABLET, FILM COATED ORAL DAILY
Qty: 90 TABLET | Refills: 0 | Status: SHIPPED | OUTPATIENT
Start: 2024-11-08

## 2024-11-20 DIAGNOSIS — R10.11 RUQ PAIN: ICD-10-CM

## 2024-11-20 DIAGNOSIS — R68.81 EARLY SATIETY: ICD-10-CM

## 2024-11-21 RX ORDER — OMEPRAZOLE 40 MG/1
40 CAPSULE, DELAYED RELEASE ORAL DAILY
Qty: 30 CAPSULE | Refills: 0 | Status: SHIPPED | OUTPATIENT
Start: 2024-11-21

## 2024-11-21 NOTE — TELEPHONE ENCOUNTER
Rx Refill Note  Pending Prescriptions:                       Disp   Refills    omeprazole (priLOSEC) 40 MG capsule [Pharm*90 cap*2        Sig: Take 1 capsule by mouth Daily.    Last office visit with prescribing clinician: 6/6/2023   Last telemedicine visit with prescribing clinician: Visit date not found   Next office visit with prescribing clinician: Visit date not found         Ratna Chapa MA  11/21/24, 08:15 EST

## 2024-12-21 DIAGNOSIS — R68.81 EARLY SATIETY: ICD-10-CM

## 2024-12-21 DIAGNOSIS — R10.11 RUQ PAIN: ICD-10-CM

## 2024-12-23 NOTE — TELEPHONE ENCOUNTER
Rx Refill Note  Requested Prescriptions     Pending Prescriptions Disp Refills    omeprazole (priLOSEC) 40 MG capsule [Pharmacy Med Name: OMEPRAZOLE DR 40 MG CAPSULE] 30 capsule 0     Sig: TAKE 1 CAPSULE BY MOUTH DAILY      Last office visit with prescribing clinician: 6/6/2023   Last telemedicine visit with prescribing clinician: Visit date not found   Next office visit with prescribing clinician: Visit date not found                         Would you like a call back once the refill request has been completed: [] Yes [] No    If the office needs to give you a call back, can they leave a voicemail: [] Yes [] No    JAVAN Cuellar  12/23/24, 07:56 EST

## 2024-12-27 RX ORDER — OMEPRAZOLE 40 MG/1
40 CAPSULE, DELAYED RELEASE ORAL DAILY
Qty: 30 CAPSULE | Refills: 0 | OUTPATIENT
Start: 2024-12-27

## 2025-02-08 DIAGNOSIS — E03.9 ACQUIRED HYPOTHYROIDISM: ICD-10-CM

## 2025-02-10 RX ORDER — LEVOTHYROXINE SODIUM 125 UG/1
125 TABLET ORAL DAILY
Qty: 90 TABLET | Refills: 1 | OUTPATIENT
Start: 2025-02-10

## 2025-02-11 DIAGNOSIS — E03.9 ACQUIRED HYPOTHYROIDISM: ICD-10-CM

## 2025-02-11 RX ORDER — TRAZODONE HYDROCHLORIDE 50 MG/1
50-100 TABLET, FILM COATED ORAL NIGHTLY PRN
Qty: 180 TABLET | Refills: 0 | Status: SHIPPED | OUTPATIENT
Start: 2025-02-11

## 2025-02-11 RX ORDER — LEVOTHYROXINE SODIUM 125 UG/1
125 TABLET ORAL DAILY
Qty: 90 TABLET | Refills: 1 | Status: CANCELLED | OUTPATIENT
Start: 2025-02-11

## 2025-02-11 NOTE — TELEPHONE ENCOUNTER
Trazodone sent.  The levothyroxine comes from her endocrinologist Dr. Rosenstein so she needs to contact them for refills.

## 2025-02-11 NOTE — TELEPHONE ENCOUNTER
Caller: BariNat    Relationship: Self    Best call back number: [phone]     Requested Prescriptions:   Requested Prescriptions     Pending Prescriptions Disp Refills    traZODone (DESYREL) 50 MG tablet 180 tablet 0     Sig: Take 1-2 tablets by mouth At Night As Needed for Sleep.    levothyroxine (SYNTHROID, LEVOTHROID) 125 MCG tablet 90 tablet 1     Sig: Take 1 tablet by mouth Daily.        Pharmacy where request should be sent: [facility] [id number] [address]  - [phone] [facility] [phone] FX     Last office visit with prescribing clinician: 9/13/2024   Last telemedicine visit with prescribing clinician: Visit date not found   Next office visit with prescribing clinician: Visit date not found     Does the patient have less than a 3 day supply:  [x] Yes  [] No    Samantha Cabello Rep   02/11/25 09:53 EST

## 2025-02-16 ENCOUNTER — PATIENT MESSAGE (OUTPATIENT)
Dept: FAMILY MEDICINE CLINIC | Facility: CLINIC | Age: 65
End: 2025-02-16
Payer: MEDICARE

## 2025-02-18 DIAGNOSIS — E03.9 ACQUIRED HYPOTHYROIDISM: ICD-10-CM

## 2025-02-18 RX ORDER — LEVOTHYROXINE SODIUM 125 UG/1
125 TABLET ORAL DAILY
Qty: 15 TABLET | Refills: 0 | Status: SHIPPED | OUTPATIENT
Start: 2025-02-18

## 2025-02-18 RX ORDER — LEVOTHYROXINE SODIUM 125 UG/1
125 TABLET ORAL DAILY
Qty: 90 TABLET | Refills: 1 | OUTPATIENT
Start: 2025-02-18

## 2025-02-18 NOTE — TELEPHONE ENCOUNTER
Caller: Bari, Nat Megan    Relationship: Self    Best call back number: [phone]    Requested Prescriptions:   Requested Prescriptions     Pending Prescriptions Disp Refills    levothyroxine (SYNTHROID, LEVOTHROID) 125 MCG tablet 90 tablet 1     Sig: Take 1 tablet by mouth Daily.        Pharmacy where request should be sent: [facility] [id number] [address] - [phone]  - [phone] FX     Last office visit with prescribing clinician: 9/13/2024   Last telemedicine visit with prescribing clinician: Visit date not found   Next office visit with prescribing clinician: 2/24/2025     Additional details provided by patient: PATIENT IS REQUESTING A REFILL UNTIL APPOINTMENT    Does the patient have less than a 3 day supply:  [x] Yes  [] No    Would you like a call back once the refill request has been completed: [] Yes [x] No    If the office needs to give you a call back, can they leave a voicemail: [] Yes [x] No    Samantha Parmar Rep   02/18/25 14:20 EST

## 2025-02-19 RX ORDER — TRIAMTERENE AND HYDROCHLOROTHIAZIDE 37.5; 25 MG/1; MG/1
1 TABLET ORAL DAILY
Qty: 90 TABLET | Refills: 1 | Status: SHIPPED | OUTPATIENT
Start: 2025-02-19

## 2025-03-04 DIAGNOSIS — E03.9 ACQUIRED HYPOTHYROIDISM: ICD-10-CM

## 2025-03-04 RX ORDER — LEVOTHYROXINE SODIUM 125 UG/1
125 TABLET ORAL DAILY
Qty: 15 TABLET | Refills: 0 | Status: SHIPPED | OUTPATIENT
Start: 2025-03-04

## 2025-03-11 ENCOUNTER — OFFICE VISIT (OUTPATIENT)
Dept: FAMILY MEDICINE CLINIC | Facility: CLINIC | Age: 65
End: 2025-03-11
Payer: MEDICARE

## 2025-03-11 VITALS
DIASTOLIC BLOOD PRESSURE: 84 MMHG | WEIGHT: 172.38 LBS | OXYGEN SATURATION: 98 % | BODY MASS INDEX: 30.54 KG/M2 | HEIGHT: 63 IN | SYSTOLIC BLOOD PRESSURE: 130 MMHG | TEMPERATURE: 97.7 F | HEART RATE: 62 BPM

## 2025-03-11 DIAGNOSIS — F33.0 RECURRENT MAJOR DEPRESSIVE EPISODES, MILD: ICD-10-CM

## 2025-03-11 DIAGNOSIS — E78.2 MIXED HYPERLIPIDEMIA: ICD-10-CM

## 2025-03-11 DIAGNOSIS — J01.00 ACUTE NON-RECURRENT MAXILLARY SINUSITIS: ICD-10-CM

## 2025-03-11 DIAGNOSIS — E55.9 VITAMIN D DEFICIENCY: ICD-10-CM

## 2025-03-11 DIAGNOSIS — R10.9 RIGHT FLANK PAIN: Primary | ICD-10-CM

## 2025-03-11 DIAGNOSIS — R82.90 NONSPECIFIC FINDING ON EXAMINATION OF URINE: ICD-10-CM

## 2025-03-11 DIAGNOSIS — Z79.899 ENCOUNTER FOR LONG-TERM (CURRENT) USE OF MEDICATIONS: ICD-10-CM

## 2025-03-11 DIAGNOSIS — I10 BENIGN HYPERTENSION: ICD-10-CM

## 2025-03-11 DIAGNOSIS — E03.9 ACQUIRED HYPOTHYROIDISM: ICD-10-CM

## 2025-03-11 DIAGNOSIS — Z13.820 SCREENING FOR OSTEOPOROSIS: ICD-10-CM

## 2025-03-11 DIAGNOSIS — Z78.0 POSTMENOPAUSAL: ICD-10-CM

## 2025-03-11 DIAGNOSIS — F51.01 PRIMARY INSOMNIA: ICD-10-CM

## 2025-03-11 RX ORDER — TROSPIUM CHLORIDE ER 60 MG/1
CAPSULE ORAL
COMMUNITY
Start: 2025-02-06

## 2025-03-11 RX ORDER — LEVOTHYROXINE SODIUM 125 UG/1
125 TABLET ORAL DAILY
Qty: 90 TABLET | Refills: 1 | Status: SHIPPED | OUTPATIENT
Start: 2025-03-11

## 2025-03-11 RX ORDER — AMLODIPINE BESYLATE 10 MG/1
10 TABLET ORAL DAILY
Qty: 90 TABLET | Refills: 1 | Status: SHIPPED | OUTPATIENT
Start: 2025-03-11

## 2025-03-11 RX ORDER — TRIAMTERENE AND HYDROCHLOROTHIAZIDE 37.5; 25 MG/1; MG/1
1 TABLET ORAL DAILY
Qty: 90 TABLET | Refills: 1 | Status: SHIPPED | OUTPATIENT
Start: 2025-03-11

## 2025-03-11 RX ORDER — TRAZODONE HYDROCHLORIDE 50 MG/1
50-100 TABLET ORAL NIGHTLY PRN
Qty: 180 TABLET | Refills: 1 | Status: SHIPPED | OUTPATIENT
Start: 2025-03-11

## 2025-03-11 RX ORDER — SERTRALINE HYDROCHLORIDE 100 MG/1
100 TABLET, FILM COATED ORAL DAILY
Qty: 90 TABLET | Refills: 1 | Status: SHIPPED | OUTPATIENT
Start: 2025-03-11

## 2025-03-12 LAB
25(OH)D3+25(OH)D2 SERPL-MCNC: 23.3 NG/ML (ref 30–100)
ALBUMIN SERPL-MCNC: 4.4 G/DL (ref 3.9–4.9)
ALP SERPL-CCNC: 80 IU/L (ref 44–121)
ALT SERPL-CCNC: 14 IU/L (ref 0–32)
AST SERPL-CCNC: 15 IU/L (ref 0–40)
BASOPHILS # BLD AUTO: 0.1 X10E3/UL (ref 0–0.2)
BASOPHILS NFR BLD AUTO: 1 %
BILIRUB SERPL-MCNC: 0.4 MG/DL (ref 0–1.2)
BUN SERPL-MCNC: 12 MG/DL (ref 8–27)
BUN/CREAT SERPL: 16 (ref 12–28)
CALCIUM SERPL-MCNC: 9.8 MG/DL (ref 8.7–10.3)
CHLORIDE SERPL-SCNC: 96 MMOL/L (ref 96–106)
CHOLEST SERPL-MCNC: 260 MG/DL (ref 100–199)
CO2 SERPL-SCNC: 23 MMOL/L (ref 20–29)
CREAT SERPL-MCNC: 0.76 MG/DL (ref 0.57–1)
EGFRCR SERPLBLD CKD-EPI 2021: 87 ML/MIN/1.73
EOSINOPHIL # BLD AUTO: 0 X10E3/UL (ref 0–0.4)
EOSINOPHIL NFR BLD AUTO: 1 %
ERYTHROCYTE [DISTWIDTH] IN BLOOD BY AUTOMATED COUNT: 13.4 % (ref 11.7–15.4)
GLOBULIN SER CALC-MCNC: 1.9 G/DL (ref 1.5–4.5)
GLUCOSE SERPL-MCNC: 82 MG/DL (ref 70–99)
HCT VFR BLD AUTO: 40.6 % (ref 34–46.6)
HDLC SERPL-MCNC: 76 MG/DL
HGB BLD-MCNC: 13.1 G/DL (ref 11.1–15.9)
IMM GRANULOCYTES # BLD AUTO: 0 X10E3/UL (ref 0–0.1)
IMM GRANULOCYTES NFR BLD AUTO: 0 %
LDLC SERPL CALC-MCNC: 167 MG/DL (ref 0–99)
LYMPHOCYTES # BLD AUTO: 1.3 X10E3/UL (ref 0.7–3.1)
LYMPHOCYTES NFR BLD AUTO: 25 %
MAGNESIUM SERPL-MCNC: 2 MG/DL (ref 1.6–2.3)
MCH RBC QN AUTO: 29 PG (ref 26.6–33)
MCHC RBC AUTO-ENTMCNC: 32.3 G/DL (ref 31.5–35.7)
MCV RBC AUTO: 90 FL (ref 79–97)
MONOCYTES # BLD AUTO: 0.5 X10E3/UL (ref 0.1–0.9)
MONOCYTES NFR BLD AUTO: 9 %
NEUTROPHILS # BLD AUTO: 3.2 X10E3/UL (ref 1.4–7)
NEUTROPHILS NFR BLD AUTO: 64 %
PLATELET # BLD AUTO: 155 X10E3/UL (ref 150–450)
POTASSIUM SERPL-SCNC: 4.2 MMOL/L (ref 3.5–5.2)
PROT SERPL-MCNC: 6.3 G/DL (ref 6–8.5)
RBC # BLD AUTO: 4.52 X10E6/UL (ref 3.77–5.28)
SODIUM SERPL-SCNC: 135 MMOL/L (ref 134–144)
TRIGL SERPL-MCNC: 97 MG/DL (ref 0–149)
TSH SERPL DL<=0.005 MIU/L-ACNC: 0.55 UIU/ML (ref 0.45–4.5)
VLDLC SERPL CALC-MCNC: 17 MG/DL (ref 5–40)
WBC # BLD AUTO: 5 X10E3/UL (ref 3.4–10.8)

## 2025-03-12 NOTE — PROGRESS NOTES
Chief Complaint  Hypertension, Hyperlipidemia, and Hypothyroidism    Subjective          Nat Candelaria presents to [facility]  Hypertension  Pertinent negatives include no chest pain or shortness of breath.   Hyperlipidemia  Exacerbating diseases include hypothyroidism. Pertinent negatives include no chest pain or shortness of breath.   Hypothyroidism  Patient reports no diarrhea, fatigue or trouble swallowing. Her past medical history is significant for hyperlipidemia.     History of Present Illness  The patient is a 64-year-old female who presents for medication refills. She has a history of hyperlipidemia, hypertension, thrombocytopenia, hypothyroidism, depression, arthralgias, and insomnia. She is doing well on her current medications including amlodipine, levothyroxine, omeprazole, Zoloft, trazodone, and triamterene hydrochlorothiazide.    She reports experiencing right upper quadrant pain, which she attributes to a previous diagnosis of kidney stones. The onset of this pain was approximately 3 to 4 years ago, prompting an emergency room visit where the presence of a kidney stone was confirmed. However, she is uncertain about the passage of the stone. Over the past year, she has had recurrent episodes of this pain, with the most recent episode occurring a few days ago.She describes her urine as having a sour odor. She is currently on trospium, prescribed by Dr. Montelongo. No fever. No chills.  No dizziness no headache no chest pain no chest pressure no shortness of breath no trouble breathing.    She has been experiencing sinus issues for over 2 weeks.  Sinus pressure and congestion present.  No fever no chills no body aches. She does not report any cough or excessive throat clearing. She also reports a sensation of fullness in her ears, as if fluid is present. She typically manages her symptoms with generic Claritin and has used Flonase in the past, although not  "regularly.    MEDICATIONS  Amlodipine, levothyroxine, omeprazole, Zoloft, trazodone, triamterene hydrochlorothiazide, trospium, Claritin, Flonase.    Patient Care Team:  Jesus Chang APRN as PCP - General (Nurse Practitioner)  Rosenstein, Kyle S, MD as Consulting Physician (Endocrinology)  Juan Jose Montelongo MD (Urology)     Objective   Vital Signs:   /84   Pulse 62   Temp 97.7 °F (36.5 °C)   Ht 160 cm (63\")   Wt 78.2 kg (172 lb 6 oz)   SpO2 98%   BMI 30.53 kg/m²     Body mass index is 30.53 kg/m².    Review of Systems   Constitutional:  Negative for chills, fatigue and fever.   HENT:  Positive for congestion and sinus pressure. Negative for ear pain, sneezing, sore throat, swollen glands and trouble swallowing.    Respiratory:  Negative for cough, shortness of breath and wheezing.    Cardiovascular:  Negative for chest pain.   Gastrointestinal:  Negative for abdominal pain, diarrhea, nausea and vomiting.   Genitourinary:  Positive for flank pain. Negative for decreased urine volume, dysuria, frequency, hematuria and urgency.   Musculoskeletal:  Negative for back pain.   Skin:  Negative for rash.   Neurological:  Negative for dizziness, weakness, light-headedness and headache.   Psychiatric/Behavioral: Negative.         Past History:  Medical History: has a past medical history of Acquired hypothyroidism, Allergic, Benign hypertension, Clotting disorder (1999), Colon polyp (2000), Degenerative joint disease involving multiple joints, Diverticulosis, Hashimoto's thyroiditis (2023), Headache (2007), HL (hearing loss), Kidney stone, Low back pain (2006), Mixed hyperlipidemia, Obesity, Primary insomnia, Primary osteoarthritis involving multiple joints, Recurrent major depressive episodes, mild, and Urinary tract infection.   Surgical History: has a past surgical history that includes Hysterectomy; Spine surgery; Tubal ligation (1994); Cholecystectomy (2001); Colonoscopy (2000); Appendectomy; " Total abdominal hysterectomy w/ bilateral salpingoophorectomy; Breast biopsy (Right, 2009); and Breast cyst excision (Right, 2009).   Family History: family history includes Arthritis in her mother; Atrial fibrillation in her sister; Breast cancer in her mother; Cancer in her mother; Colon polyps in her mother; Coronary artery disease in her father and mother; Heart disease in her mother; Hyperlipidemia in her mother; Hypertension in her father; Lung cancer in her mother; Thyroid disease in her brother.   Social History: reports that she quit smoking about 33 years ago. Her smoking use included cigarettes. She started smoking about 51 years ago. She has a 20 pack-year smoking history. She has been exposed to tobacco smoke. She has never used smokeless tobacco. She reports that she does not currently use alcohol. She reports that she does not use drugs.    PHQ-2 Depression Screening  Little interest or pleasure in doing things?     Feeling down, depressed, or hopeless?     PHQ-2 Total Score         PHQ-9 Depression Screening  Little interest or pleasure in doing things?     Feeling down, depressed, or hopeless?     PHQ-2 Total Score     Trouble falling or staying asleep, or sleeping too much?     Feeling tired or having little energy?     Poor appetite or overeating?     Feeling bad about yourself - or that you are a failure or have let yourself or your family down?     Trouble concentrating on things, such as reading the newspaper or watching television?     Moving or speaking so slowly that other people could have noticed? Or the opposite - being so fidgety or restless that you have been moving around a lot more than usual?     Thoughts that you would be better off dead, or of hurting yourself in some way?     PHQ-9 Total Score     If you checked off any problems, how difficult have these problems made it for you to do your work, take care of things at home, or get along with other people?          PHQ-9 Total  Score:        Patient screened positive for depression based on a PHQ-9 score of  on . Follow-up recommendations include:          Current Outpatient Medications:     amLODIPine (NORVASC) 10 MG tablet, Take 1 tablet by mouth Daily., Disp: 90 tablet, Rfl: 1    cyclobenzaprine (FLEXERIL) 5 MG tablet, Take 1 tablet by mouth 3 (Three) Times a Day., Disp: , Rfl:     levothyroxine (SYNTHROID, LEVOTHROID) 125 MCG tablet, Take 1 tablet by mouth Daily., Disp: 90 tablet, Rfl: 1    omeprazole (priLOSEC) 40 MG capsule, TAKE 1 CAPSULE BY MOUTH DAILY, Disp: 30 capsule, Rfl: 0    sertraline (ZOLOFT) 100 MG tablet, Take 1 tablet by mouth Daily., Disp: 90 tablet, Rfl: 1    traZODone (DESYREL) 50 MG tablet, Take 1-2 tablets by mouth At Night As Needed for Sleep., Disp: 180 tablet, Rfl: 1    triamterene-hydrochlorothiazide (MAXZIDE-25) 37.5-25 MG per tablet, Take 1 tablet by mouth Daily., Disp: 90 tablet, Rfl: 1    trospium 60 MG capsule sustained-release 24 hr capsule, , Disp: , Rfl:     amoxicillin-clavulanate (AUGMENTIN) 875-125 MG per tablet, Take 1 tablet by mouth 2 (Two) Times a Day., Disp: 20 tablet, Rfl: 0   (Not in a hospital admission)     Allergies: Sulfa antibiotics    Physical Exam  Constitutional:       Appearance: Normal appearance.   HENT:      Right Ear: Tympanic membrane, ear canal and external ear normal.      Left Ear: Tympanic membrane, ear canal and external ear normal.      Nose: Congestion present.      Comments: Maxillary sinuses tender bilaterally.     Mouth/Throat:      Mouth: Mucous membranes are moist.      Pharynx: Oropharynx is clear.   Eyes:      Conjunctiva/sclera: Conjunctivae normal.      Pupils: Pupils are equal, round, and reactive to light.   Cardiovascular:      Rate and Rhythm: Normal rate and regular rhythm.      Heart sounds: Normal heart sounds.   Pulmonary:      Effort: Pulmonary effort is normal.      Breath sounds: Normal breath sounds.   Abdominal:      General: Abdomen is flat. Bowel  sounds are normal. There is no distension.      Palpations: Abdomen is soft.      Tenderness: There is no abdominal tenderness. There is no right CVA tenderness, left CVA tenderness, guarding or rebound.   Skin:     General: Skin is warm.      Findings: No erythema or rash.   Neurological:      General: No focal deficit present.      Mental Status: She is alert and oriented to person, place, and time. Mental status is at baseline.   Psychiatric:         Mood and Affect: Mood normal.         Behavior: Behavior normal.         Thought Content: Thought content normal.         Judgment: Judgment normal.        Physical Exam  There is a little streak of drainage in the back of her throat. There is fluid in her ears.  Lungs sound normal.    Result Review :          Results               Assessment and Plan    Diagnoses and all orders for this visit:    1. Right flank pain (Primary)  -     CT Abdomen Pelvis Without Contrast; Future  -     CBC & Differential  -     Comprehensive Metabolic Panel  -     POC Urinalysis Dipstick, Automated; Future  -     POC Urinalysis Dipstick, Automated    2. Mixed hyperlipidemia  -     Lipid Panel    3. Benign hypertension  -     amLODIPine (NORVASC) 10 MG tablet; Take 1 tablet by mouth Daily.  Dispense: 90 tablet; Refill: 1  -     triamterene-hydrochlorothiazide (MAXZIDE-25) 37.5-25 MG per tablet; Take 1 tablet by mouth Daily.  Dispense: 90 tablet; Refill: 1    4. Acquired hypothyroidism  -     levothyroxine (SYNTHROID, LEVOTHROID) 125 MCG tablet; Take 1 tablet by mouth Daily.  Dispense: 90 tablet; Refill: 1  -     TSH Rfx On Abnormal To Free T4    5. Recurrent major depressive episodes, mild  -     sertraline (ZOLOFT) 100 MG tablet; Take 1 tablet by mouth Daily.  Dispense: 90 tablet; Refill: 1    6. Acute non-recurrent maxillary sinusitis  -     amoxicillin-clavulanate (AUGMENTIN) 875-125 MG per tablet; Take 1 tablet by mouth 2 (Two) Times a Day.  Dispense: 20 tablet; Refill: 0    7.  Screening for osteoporosis  -     DEXA Bone Density Axial    8. Primary insomnia  -     traZODone (DESYREL) 50 MG tablet; Take 1-2 tablets by mouth At Night As Needed for Sleep.  Dispense: 180 tablet; Refill: 1    9. Encounter for long-term (current) use of medications  -     CBC & Differential  -     Comprehensive Metabolic Panel  -     Magnesium    10. Vitamin D deficiency  -     Vitamin D,25-Hydroxy    11. Nonspecific finding on examination of urine  -     Urine Culture - Urine, Urine, Clean Catch    12. Postmenopausal  -     DEXA Bone Density Axial      Assessment & Plan  1. Right flank pain.  Blood work will be conducted to assess kidney function. A urine sample will be collected to rule out any potential infection.  Urine culture sent.  Call in 2 days for results.  Return in 1 to 2 weeks for repeat UA to make sure clears.  A CT scan will be scheduled for Thursday (pt. Requesting this day) morning to confirm the presence of a kidney stone and exclude other possible causes.  Call after CT scan for results.  Stay hydrated with water.  Education provided.  Go to ED if worsens.  Return to clinic or ED with any issues or concerns.  Continue to follow-up with urology.    2. Sinusitis.  A 10-day course of antibiotics will be initiated. She is advised to continue her allergy medication and use Flonase, administering 2 squirts in each nostril daily to facilitate fluid drainage from the ears and prevent an ear infection.  Fluids and rest encouraged.  Tylenol as needed for pain fever.  Risk of meds discussed and understood.  Education provided.  Return in 2 days if no improvement, sooner if worsens.  Return to clinic or ED with any issues or concerns.    3. Medication management.  Refills for all current medications, including amlodipine, levothyroxine, omeprazole, Zoloft, trazodone, and triamterene hydrochlorothiazide, will be sent.  Goal blood pressure less than 140/90.  Let me know if gets to or above that.  PHQ-9  completed and discussed.  No thoughts of suicide or hurting herself or anyone else.  Proper diet and exercise plan discussed and encouraged.  Risk of meds discussed and understood.  Education provided.  Return to clinic or ED with any issues or concerns.                        Follow Up   Return in about 6 months (around 9/14/2025), or if symptoms worsen or fail to improve, for Annual physical, Medicare Wellness.  Patient was given instructions and counseling regarding her condition or for health maintenance advice. Please see specific information pulled into the AVS if appropriate.     Patient or patient representative verbalized consent for the use of Ambient Listening during the visit with  SABRINA Salmon for chart documentation. 3/12/2025  12:45 EDT    SABRINA Salmon

## 2025-03-13 LAB
BACTERIA UR CULT: NORMAL
BACTERIA UR CULT: NORMAL

## 2025-03-16 DIAGNOSIS — E03.9 ACQUIRED HYPOTHYROIDISM: ICD-10-CM

## 2025-03-17 RX ORDER — LEVOTHYROXINE SODIUM 125 UG/1
125 TABLET ORAL DAILY
Qty: 15 TABLET | OUTPATIENT
Start: 2025-03-17

## 2025-06-09 ENCOUNTER — OFFICE VISIT (OUTPATIENT)
Dept: FAMILY MEDICINE CLINIC | Facility: CLINIC | Age: 65
End: 2025-06-09
Payer: MEDICARE

## 2025-06-09 VITALS
HEIGHT: 63 IN | SYSTOLIC BLOOD PRESSURE: 124 MMHG | DIASTOLIC BLOOD PRESSURE: 80 MMHG | OXYGEN SATURATION: 98 % | HEART RATE: 73 BPM | WEIGHT: 171.31 LBS | BODY MASS INDEX: 30.35 KG/M2

## 2025-06-09 DIAGNOSIS — R21 RASH: ICD-10-CM

## 2025-06-09 DIAGNOSIS — M54.50 LUMBAR PAIN: ICD-10-CM

## 2025-06-09 DIAGNOSIS — M54.2 NECK PAIN: Primary | ICD-10-CM

## 2025-06-09 DIAGNOSIS — Z13.820 SCREENING FOR OSTEOPOROSIS: ICD-10-CM

## 2025-06-09 DIAGNOSIS — R26.89 BALANCE DISORDER: ICD-10-CM

## 2025-06-09 DIAGNOSIS — Z78.0 POSTMENOPAUSAL: ICD-10-CM

## 2025-06-09 DIAGNOSIS — G89.29 CHRONIC MIDLINE THORACIC BACK PAIN: ICD-10-CM

## 2025-06-09 DIAGNOSIS — Z79.899 ENCOUNTER FOR LONG-TERM (CURRENT) USE OF MEDICATIONS: ICD-10-CM

## 2025-06-09 DIAGNOSIS — Z12.31 ENCOUNTER FOR SCREENING MAMMOGRAM FOR MALIGNANT NEOPLASM OF BREAST: ICD-10-CM

## 2025-06-09 DIAGNOSIS — M54.6 CHRONIC MIDLINE THORACIC BACK PAIN: ICD-10-CM

## 2025-06-09 PROCEDURE — 1160F RVW MEDS BY RX/DR IN RCRD: CPT | Performed by: NURSE PRACTITIONER

## 2025-06-09 PROCEDURE — 3074F SYST BP LT 130 MM HG: CPT | Performed by: NURSE PRACTITIONER

## 2025-06-09 PROCEDURE — 1125F AMNT PAIN NOTED PAIN PRSNT: CPT | Performed by: NURSE PRACTITIONER

## 2025-06-09 PROCEDURE — G2211 COMPLEX E/M VISIT ADD ON: HCPCS | Performed by: NURSE PRACTITIONER

## 2025-06-09 PROCEDURE — 3079F DIAST BP 80-89 MM HG: CPT | Performed by: NURSE PRACTITIONER

## 2025-06-09 PROCEDURE — 1159F MED LIST DOCD IN RCRD: CPT | Performed by: NURSE PRACTITIONER

## 2025-06-09 PROCEDURE — 99214 OFFICE O/P EST MOD 30 MIN: CPT | Performed by: NURSE PRACTITIONER

## 2025-06-09 RX ORDER — PREDNISONE 20 MG/1
40 TABLET ORAL DAILY
Qty: 10 TABLET | Refills: 0 | Status: SHIPPED | OUTPATIENT
Start: 2025-06-09

## 2025-06-09 NOTE — PROGRESS NOTES
Chief Complaint  referral    Subjective          Nat Candelaria presents to [facility]  History of Present Illness  History of Present Illness  The patient is a 64-year-old female who presents for referral requests.    She has undergone three cervical surgeries, the most recent of which was in 2013 and was unsuccessful. She experiences neck pain similar to her pre-surgical symptoms. States pain and tingling (at times) basically in entire spine. It has been several years since her last spinal imaging. She has a history of a bulging disc in her mid-back but has not undergone any other back surgeries. She has been advised against traction due to her fused fusion. She has a history of congenital stenosis, degenerative disc disease, and atrophy. She has a history of working in school cafeterias for 17 years, which involved heavy lifting. She continued working after her first surgery but stopped following the second one. Over the years, she has sought treatment from a pain management clinic, receiving numerous epidurals and trigger point injections, which provided intermittent relief. However, she discontinued these treatments due to financial constraints following her 's cancer diagnosis. Over-the-counter medications such as Tylenol and ibuprofen have not provided relief. She has resorted to using her late 's pain medication to manage severe pain episodes, which would have otherwise necessitated emergency room visits. She describes her current pain as mild pressure, which she can predict will not subside. No urinary or bowel issues.  States her past neck surgeon was Dr. Garcia being with [facility] and is requesting a referral to a spine specialist in [place].    During her last dermatology visit, she was informed that the rash on both her legs might be a type of vasculitis and was advised to consult a rheumatologist. She has not previously seen a rheumatologist. She  "does not currently have a rash but reports intermittent rashes over the past two weeks to lower extremities.     She is attempting to manage her cholesterol levels through dietary changes and increased physical activity, specifically walking. She has not yet implemented these changes. Denies cholesterol lower medication and understands the risks.     She is due for a mammogram and has not yet completed her DEXA scan.    She has a history of hypothyroidism and idiopathic thrombocytopenic purpura (ITP).     She also states she has intermittent balance disorders.  Currently sees ENT with Dr. Angelo.  Is requesting referral to neurology for further evaluation of this as well.  No altered mental status no confusion no vision issues.  No chest pain no chest pressure no shortness of breath no trouble breathing.      PAST SURGICAL HISTORY:  Three cervical surgeries (most recent in 2013)    Patient Care Team:  Jesus Chang APRN as PCP - General (Nurse Practitioner)  Rosenstein, Kyle S, MD as Consulting Physician (Endocrinology)  Juan Jose Montelongo MD (Urology)     Objective   Vital Signs:   /80   Pulse 73   Ht 160 cm (63\")   Wt 77.7 kg (171 lb 5 oz)   SpO2 98%   BMI 30.35 kg/m²     Body mass index is 30.35 kg/m².    Review of Systems   Constitutional:  Negative for chills, fatigue and fever.   HENT:  Negative for congestion.    Eyes:  Negative for visual disturbance.   Respiratory:  Negative for cough, chest tightness, shortness of breath and wheezing.    Cardiovascular:  Negative for chest pain.   Gastrointestinal:  Negative for abdominal pain, diarrhea, nausea and vomiting.   Genitourinary:  Negative for decreased urine volume, dysuria, frequency, hematuria and urgency.   Musculoskeletal:  Positive for back pain and neck pain.   Skin:  Negative for rash, skin lesions and wound.   Neurological:  Negative for dizziness, speech difficulty, weakness, numbness, headache, memory problem and confusion. "   Psychiatric/Behavioral:  Negative for suicidal ideas and stress. The patient is not nervous/anxious.        Past History:  Medical History: has a past medical history of Acquired hypothyroidism, Allergic, Benign hypertension, Clotting disorder (1999), Colon polyp (2000), Degenerative joint disease involving multiple joints, Diverticulosis, Hashimoto's thyroiditis (2023), Headache (2007), HL (hearing loss), Kidney stone, Low back pain (2006), Mixed hyperlipidemia, Obesity, Primary insomnia, Primary osteoarthritis involving multiple joints, Recurrent major depressive episodes, mild, and Urinary tract infection.   Surgical History: has a past surgical history that includes Hysterectomy; Spine surgery; Tubal ligation (1994); Cholecystectomy (2001); Colonoscopy (2000); Appendectomy; Total abdominal hysterectomy w/ bilateral salpingoophorectomy; Breast biopsy (Right, 2009); and Breast cyst excision (Right, 2009).   Family History: family history includes Arthritis in her mother; Atrial fibrillation in her sister; Breast cancer in her mother; Cancer in her mother; Colon polyps in her mother; Coronary artery disease in her father and mother; Heart disease in her mother; Hyperlipidemia in her mother; Hypertension in her father; Lung cancer in her mother; Thyroid disease in her brother.   Social History: reports that she quit smoking about 33 years ago. Her smoking use included cigarettes. She started smoking about 51 years ago. She has a 20 pack-year smoking history. She has been exposed to tobacco smoke. She has never used smokeless tobacco. She reports that she does not currently use alcohol. She reports that she does not use drugs.    PHQ-2 Depression Screening  Little interest or pleasure in doing things? Not at all   Feeling down, depressed, or hopeless? Not at all   PHQ-2 Total Score 0       PHQ-9 Depression Screening  Little interest or pleasure in doing things? Not at all   Feeling down, depressed, or hopeless? Not  at all   PHQ-2 Total Score 0   Trouble falling or staying asleep, or sleeping too much?     Feeling tired or having little energy?     Poor appetite or overeating?     Feeling bad about yourself - or that you are a failure or have let yourself or your family down?     Trouble concentrating on things, such as reading the newspaper or watching television?     Moving or speaking so slowly that other people could have noticed? Or the opposite - being so fidgety or restless that you have been moving around a lot more than usual?     Thoughts that you would be better off dead, or of hurting yourself in some way?     PHQ-9 Total Score     If you checked off any problems, how difficult have these problems made it for you to do your work, take care of things at home, or get along with other people? Not difficult at all        PHQ-9 Total Score:        Patient screened positive for depression based on a PHQ-9 score of  on . Follow-up recommendations include:          Current Outpatient Medications:     amLODIPine (NORVASC) 10 MG tablet, Take 1 tablet by mouth Daily., Disp: 90 tablet, Rfl: 1    levothyroxine (SYNTHROID, LEVOTHROID) 125 MCG tablet, Take 1 tablet by mouth Daily., Disp: 90 tablet, Rfl: 1    sertraline (ZOLOFT) 100 MG tablet, Take 1 tablet by mouth Daily., Disp: 90 tablet, Rfl: 1    traZODone (DESYREL) 50 MG tablet, Take 1-2 tablets by mouth At Night As Needed for Sleep., Disp: 180 tablet, Rfl: 1    triamterene-hydrochlorothiazide (MAXZIDE-25) 37.5-25 MG per tablet, Take 1 tablet by mouth Daily., Disp: 90 tablet, Rfl: 1    trospium 60 MG capsule sustained-release 24 hr capsule, , Disp: , Rfl:     predniSONE (DELTASONE) 20 MG tablet, Take 2 tablets by mouth Daily., Disp: 10 tablet, Rfl: 0   (Not in a hospital admission)     Allergies: Sulfa antibiotics    Physical Exam  Constitutional:       Appearance: Normal appearance.   HENT:      Right Ear: Tympanic membrane, ear canal and external ear normal.      Left Ear:  Tympanic membrane, ear canal and external ear normal.      Nose: Nose normal.   Eyes:      Conjunctiva/sclera: Conjunctivae normal.      Pupils: Pupils are equal, round, and reactive to light.   Cardiovascular:      Rate and Rhythm: Normal rate and regular rhythm.      Heart sounds: Normal heart sounds.   Pulmonary:      Effort: Pulmonary effort is normal.      Breath sounds: Normal breath sounds.   Musculoskeletal:      Cervical back: Normal.      Thoracic back: Normal.      Lumbar back: Normal.      Right lower leg: No edema.      Left lower leg: No edema.   Skin:     General: Skin is warm.      Findings: No erythema or rash.   Neurological:      General: No focal deficit present.      Mental Status: She is alert and oriented to person, place, and time. Mental status is at baseline.   Psychiatric:         Mood and Affect: Mood normal.         Behavior: Behavior normal.         Thought Content: Thought content normal.         Judgment: Judgment normal.        Physical Exam  Respiratory: Clear to auscultation, no wheezing, rales or rhonchi  Other: No tenderness to touch or soreness along the midline of the back    Result Review :          Results               Assessment and Plan    Diagnoses and all orders for this visit:    1. Neck pain (Primary)  -     XR Spine Cervical 2 or 3 View  -     predniSONE (DELTASONE) 20 MG tablet; Take 2 tablets by mouth Daily.  Dispense: 10 tablet; Refill: 0  -     Ambulatory Referral to Orthopedic Surgery    2. Chronic midline thoracic back pain  -     XR Spine Thoracic 2 View (In Office)  -     predniSONE (DELTASONE) 20 MG tablet; Take 2 tablets by mouth Daily.  Dispense: 10 tablet; Refill: 0  -     Ambulatory Referral to Orthopedic Surgery    3. Lumbar pain  -     XR Spine Lumbar 2 or 3 View (In Office)  -     predniSONE (DELTASONE) 20 MG tablet; Take 2 tablets by mouth Daily.  Dispense: 10 tablet; Refill: 0  -     Ambulatory Referral to Orthopedic Surgery    4. Rash  -      Ambulatory Referral to Rheumatology    5. Encounter for screening mammogram for malignant neoplasm of breast  -     Mammo Screening Digital Tomosynthesis Bilateral With CAD; Future    6. Screening for osteoporosis  -     DEXA Bone Density Axial    7. Balance disorder  Assessment & Plan:  Continue to follow-up with ENT.  Will place neurology referral.  Education provided.  Return to clinic or ED with any issues or concerns.    Orders:  -     Ambulatory Referral to Neurology    8. Encounter for long-term (current) use of medications  -     POC Glucose; Future    9. Postmenopausal  -     DEXA Bone Density Axial      Assessment & Plan  1. Neck pain.  - A full spine x-ray, including the neck, middle back, and lower back, will be ordered to assess the current state compared to previous imaging.  - Referrals to a new neck and back specialist, as well as a neurologist, will be made. If the x-rays reveal concerning findings, an MRI may be considered.  Will give a course of prednisone.  Avoid NSAIDs while on prednisone.  Limit picking up heavy objects and proper body mechanics discussed and encouraged.  Go to ED if worsens.  Return to clinic or ED with any issues or concerns.    2. Lower back pain.  - A full spine x-ray will be ordered to evaluate the current condition.  - Referrals to a new back specialist and a neurologist will be made.  - If the x-rays show concerning findings, an MRI may be considered.    3. Rash/Vasculitis.  - Reports a recent outbreak on both legs, suspected to be vasculitis by her dermatologist. She has no current rash but has experienced intermittent symptoms over the past 2 weeks.  - A referral to a rheumatologist will be made.    4. Health maintenance.  - Liver and kidney functions are within normal limits. Thyroid function is also normal.  - Due for a mammogram and a DEXA scan.  - Orders for these tests will be placed.                    Follow Up   Return if symptoms worsen or fail to  improve.  Patient was given instructions and counseling regarding her condition or for health maintenance advice. Please see specific information pulled into the AVS if appropriate.     Patient or patient representative verbalized consent for the use of Ambient Listening during the visit with  SABRINA Salmon for chart documentation. 6/9/2025  13:56 EDT    SABRINA Salmon

## 2025-06-09 NOTE — ASSESSMENT & PLAN NOTE
Continue to follow-up with ENT.  Will place neurology referral.  Education provided.  Return to clinic or ED with any issues or concerns.

## 2025-06-10 ENCOUNTER — RESULTS FOLLOW-UP (OUTPATIENT)
Dept: FAMILY MEDICINE CLINIC | Facility: CLINIC | Age: 65
End: 2025-06-10
Payer: MEDICARE

## 2025-06-10 DIAGNOSIS — I99.8 VASCULAR CALCIFICATION: Primary | ICD-10-CM

## 2025-07-14 RX ORDER — ATORVASTATIN CALCIUM 20 MG/1
20 TABLET, FILM COATED ORAL DAILY
Qty: 30 TABLET | Refills: 3 | OUTPATIENT
Start: 2025-07-14

## 2025-07-24 ENCOUNTER — EXTERNAL PBMM DATA (OUTPATIENT)
Dept: PHARMACY | Facility: OTHER | Age: 65
End: 2025-07-24
Payer: MEDICARE

## 2025-07-31 ENCOUNTER — TELEPHONE (OUTPATIENT)
Dept: FAMILY MEDICINE CLINIC | Facility: CLINIC | Age: 65
End: 2025-07-31

## 2025-08-07 ENCOUNTER — EXTERNAL PBMM DATA (OUTPATIENT)
Dept: PHARMACY | Facility: OTHER | Age: 65
End: 2025-08-07
Payer: MEDICARE

## 2025-08-21 ENCOUNTER — EXTERNAL PBMM DATA (OUTPATIENT)
Dept: PHARMACY | Facility: OTHER | Age: 65
End: 2025-08-21
Payer: MEDICARE